# Patient Record
Sex: MALE | Race: BLACK OR AFRICAN AMERICAN | NOT HISPANIC OR LATINO | Employment: FULL TIME | URBAN - METROPOLITAN AREA
[De-identification: names, ages, dates, MRNs, and addresses within clinical notes are randomized per-mention and may not be internally consistent; named-entity substitution may affect disease eponyms.]

---

## 2018-08-02 ENCOUNTER — HOSPITAL ENCOUNTER (OUTPATIENT)
Dept: GENERAL RADIOLOGY | Facility: CLINIC | Age: 43
Discharge: HOME | End: 2018-08-02
Attending: INTERNAL MEDICINE | Admitting: INTERNAL MEDICINE

## 2018-08-02 DIAGNOSIS — R76.11 PPD POSITIVE: ICD-10-CM

## 2018-08-02 PROCEDURE — 40000293 XR CHEST 1 VIEW, EMPLOYEE HEALTH

## 2019-03-04 ENCOUNTER — OFFICE VISIT (OUTPATIENT)
Dept: FAMILY MEDICINE | Facility: CLINIC | Age: 44
End: 2019-03-04
Payer: COMMERCIAL

## 2019-03-04 VITALS
SYSTOLIC BLOOD PRESSURE: 142 MMHG | HEART RATE: 102 BPM | DIASTOLIC BLOOD PRESSURE: 96 MMHG | BODY MASS INDEX: 26.34 KG/M2 | RESPIRATION RATE: 18 BRPM | HEIGHT: 70 IN | TEMPERATURE: 98.1 F | WEIGHT: 184 LBS | OXYGEN SATURATION: 98 %

## 2019-03-04 DIAGNOSIS — R73.03 PRE-DIABETES: ICD-10-CM

## 2019-03-04 DIAGNOSIS — Z00.00 WELL ADULT EXAM: Primary | ICD-10-CM

## 2019-03-04 DIAGNOSIS — Z12.5 SCREENING FOR PROSTATE CANCER: ICD-10-CM

## 2019-03-04 DIAGNOSIS — R03.0 ELEVATED BLOOD PRESSURE READING WITHOUT DIAGNOSIS OF HYPERTENSION: ICD-10-CM

## 2019-03-04 LAB
CHOLEST SERPL-MCNC: 205 MG/DL
HBA1C MFR BLD: 6 % (ref 0–5.6)
HDLC SERPL-MCNC: 41 MG/DL
LDLC SERPL CALC-MCNC: 142 MG/DL
NONHDLC SERPL-MCNC: 164 MG/DL
PSA SERPL-ACNC: 0.85 UG/L (ref 0–4)
TRIGL SERPL-MCNC: 110 MG/DL

## 2019-03-04 PROCEDURE — 36415 COLL VENOUS BLD VENIPUNCTURE: CPT | Performed by: FAMILY MEDICINE

## 2019-03-04 PROCEDURE — 80061 LIPID PANEL: CPT | Performed by: FAMILY MEDICINE

## 2019-03-04 PROCEDURE — 83036 HEMOGLOBIN GLYCOSYLATED A1C: CPT | Performed by: FAMILY MEDICINE

## 2019-03-04 PROCEDURE — G0103 PSA SCREENING: HCPCS | Performed by: FAMILY MEDICINE

## 2019-03-04 PROCEDURE — 99386 PREV VISIT NEW AGE 40-64: CPT | Performed by: FAMILY MEDICINE

## 2019-03-04 ASSESSMENT — ENCOUNTER SYMPTOMS
COUGH: 0
ABDOMINAL PAIN: 0
PARESTHESIAS: 0
FEVER: 0
DIARRHEA: 0
PALPITATIONS: 0
HEMATURIA: 0
DYSURIA: 0
MYALGIAS: 0
SORE THROAT: 1
CHILLS: 0
HEADACHES: 0
HEARTBURN: 0
FREQUENCY: 0
HEMATOCHEZIA: 0
SHORTNESS OF BREATH: 0
NERVOUS/ANXIOUS: 0
WEAKNESS: 0
ARTHRALGIAS: 1
DIZZINESS: 0
NAUSEA: 0
EYE PAIN: 0
CONSTIPATION: 1
JOINT SWELLING: 0

## 2019-03-04 ASSESSMENT — MIFFLIN-ST. JEOR: SCORE: 1749.84

## 2019-03-04 NOTE — PROGRESS NOTES
SUBJECTIVE:   CC: Gosia Millan is an 43 year old male who presents for preventative health visit.     Physical   Annual:     Getting at least 3 servings of Calcium per day:  NO    Bi-annual eye exam:  Yes    Dental care twice a year:  NO    Sleep apnea or symptoms of sleep apnea:  None    Diet:  Regular (no restrictions)    Frequency of exercise:  None    Taking medications regularly:  Yes    Medication side effects:  None    Additional concerns today:  YES (cough, family history of diabetes and would like to get it tested)    PHQ-2 Total Score: 0    CV: no early CAD in the family, but many family members with diabetes including dad, brother, and many family members on mom's side (but not mom).  He does not exercise. He follows mostly a vegan diet.   Malignancy: states his father had colon cancer in his upper 60s-70, had chemo and surgery and is doing well.  No other known family members with colon cancer, no prostate cancer in the family.    Bone health: lack of exercise, calcium.   Immunizations: tdap done 2012.          Today's PHQ-2 Score:   PHQ-2 ( 1999 [employer]) 3/4/2019   Q1: Little interest or pleasure in doing things 0   Q2: Feeling down, depressed or hopeless 0   PHQ-2 Score 0   Q1: Little interest or pleasure in doing things Not at all   Q2: Feeling down, depressed or hopeless Not at all   PHQ-2 Score 0       Abuse: Current or Past(Physical, Sexual or Emotional)- No  Do you feel safe in your environment? Yes    Social History     Tobacco Use     Smoking status: Never Smoker     Smokeless tobacco: Never Used   Substance Use Topics     Alcohol use: Yes     Comment: once every month      Alcohol Use 3/4/2019   If you drink alcohol do you typically have greater than 3 drinks per day OR greater than 7 drinks per week? No   No flowsheet data found.    Last PSA: No results found for: PSA    Reviewed orders with patient. Reviewed health maintenance and updated orders accordingly - Yes  There is no problem list on  "file for this patient.    History reviewed. No pertinent surgical history.    Social History     Tobacco Use     Smoking status: Never Smoker     Smokeless tobacco: Never Used   Substance Use Topics     Alcohol use: Yes     Comment: once every month      History reviewed. No pertinent family history.      No current outpatient medications on file.     No Known Allergies    Reviewed and updated as needed this visit by clinical staff  Tobacco  Allergies  Meds  Med Hx  Surg Hx  Fam Hx  Soc Hx        Reviewed and updated as needed this visit by Provider            Review of Systems   Constitutional: Negative for chills and fever.   HENT: Positive for sore throat. Negative for congestion, ear pain and hearing loss.    Eyes: Negative for pain and visual disturbance.   Respiratory: Negative for cough and shortness of breath.    Cardiovascular: Negative for chest pain, palpitations and peripheral edema.   Gastrointestinal: Positive for constipation. Negative for abdominal pain, diarrhea, heartburn, hematochezia and nausea.   Genitourinary: Negative for discharge, dysuria, frequency, genital sores, hematuria, impotence and urgency.   Musculoskeletal: Positive for arthralgias. Negative for joint swelling and myalgias.   Skin: Negative for rash.   Neurological: Negative for dizziness, weakness, headaches and paresthesias.   Psychiatric/Behavioral: Negative for mood changes. The patient is not nervous/anxious.        OBJECTIVE:   BP (!) 150/94 (BP Location: Right arm, Patient Position: Sitting, Cuff Size: Adult Regular)   Pulse 102   Temp 98.1  F (36.7  C) (Oral)   Resp 18   Ht 1.784 m (5' 10.25\")   Wt 83.5 kg (184 lb)   SpO2 98%   BMI 26.21 kg/m      Physical Exam  GENERAL: healthy, alert and no distress  EYES: Eyes grossly normal to inspection, PERRL and conjunctivae and sclerae normal  HENT: ear canals and TM's normal, nose and mouth without ulcers or lesions  NECK: no adenopathy, no asymmetry, masses, or scars " and thyroid normal to palpation  RESP: lungs clear to auscultation - no rales, rhonchi or wheezes  CV: regular rate and rhythm, normal S1 S2, no S3 or S4, no murmur, click or rub, no peripheral edema and peripheral pulses strong  ABDOMEN: soft, nontender, no hepatosplenomegaly, no masses and bowel sounds normal  MS: no gross musculoskeletal defects noted, no edema  SKIN: no suspicious lesions or rashes  NEURO: Normal strength and tone, mentation intact and speech normal  PSYCH: mentation appears normal, affect normal/bright, appears anxious    Diagnostic Test Results:  Results for orders placed or performed in visit on 03/04/19 (from the past 24 hour(s))   Hemoglobin A1c   Result Value Ref Range    Hemoglobin A1C 6.0 (H) 0 - 5.6 %   Lipid Profile   Result Value Ref Range    Cholesterol 205 (H) <200 mg/dL    Triglycerides 110 <150 mg/dL    HDL Cholesterol 41 >39 mg/dL    LDL Cholesterol Calculated 142 (H) <100 mg/dL    Non HDL Cholesterol 164 (H) <130 mg/dL   PSA, screen   Result Value Ref Range    PSA 0.85 0 - 4 ug/L       ASSESSMENT/PLAN:   1. Well adult exam  CV: reviewed with the patient in clinic that his A1c is elevated in the pre-diabetes range.  Discussed increasing exercise and working on diet to limit carbs.  Also discussed that the BP is elevated today.  Recommended same as above, and less salt.  Recommended recheck within one month on the blood pressure.  Recommended recheck of A1c in 6-12 months.   Malignancy: PSA; colonoscopy at 52  Bone health: recommended regular exercise  Immunizations: up to date  - Hemoglobin A1c  - Lipid Profile  - PSA, screen    2. Screening for prostate cancer    - PSA, screen    3. Pre-diabetes  As above    4. Elevated blood pressure reading without diagnosis of hypertension  As above      COUNSELING:   Reviewed preventive health counseling, as reflected in patient instructions    BP Readings from Last 1 Encounters:   No data found for BP     There is no height or weight on  file to calculate BMI.           has no tobacco history on file.      Counseling Resources:  ATP IV Guidelines  Pooled Cohorts Equation Calculator  FRAX Risk Assessment  ICSI Preventive Guidelines  Dietary Guidelines for Americans, 2010  USDA's MyPlate  ASA Prophylaxis  Lung CA Screening    Sharee Hopkins MD  [facility]

## 2019-03-04 NOTE — LETTER
March 7, 2019      Gosia Millan  [address]  [place] [address]        Dear ,    We are writing to inform you of your test results.    As discussed at your visit, your A1c test for diabetes is elevated in the pre-diabetic range.  Your PSA screening test for prostate cancer is normal.  Your cholesterol is moderately elevated.  I recommend working on diet (fewer carbohydrates) and exercise to address the blood sugar and cholesterol.  We should recheck these labs in 6 to 12 months.     Resulted Orders   Hemoglobin A1c   Result Value Ref Range    Hemoglobin A1C 6.0 (H) 0 - 5.6 %      Comment:      Normal <5.7% Prediabetes 5.7-6.4%  Diabetes 6.5% or higher - adopted from ADA   consensus guidelines.     Lipid Profile   Result Value Ref Range    Cholesterol 205 (H) <200 mg/dL      Comment:      Desirable:       <200 mg/dl    Triglycerides 110 <150 mg/dL      Comment:      Fasting specimen    HDL Cholesterol 41 >39 mg/dL    LDL Cholesterol Calculated 142 (H) <100 mg/dL      Comment:      Above desirable:  100-129 mg/dl  Borderline High:  130-159 mg/dL  High:             160-189 mg/dL  Very high:       >189 mg/dl      Non HDL Cholesterol 164 (H) <130 mg/dL      Comment:      Above Desirable:  130-159 mg/dl  Borderline high:  160-189 mg/dl  High:             190-219 mg/dl  Very high:       >219 mg/dl     PSA, screen   Result Value Ref Range    PSA 0.85 0 - 4 ug/L      Comment:      Assay Method:  Chemiluminescence using Siemens Vista analyzer       If you have any questions or concerns, please call the clinic at the number listed above.       Sincerely,        Sharee Hopkins MD/nr

## 2019-05-08 ENCOUNTER — OFFICE VISIT (OUTPATIENT)
Dept: FAMILY MEDICINE | Facility: CLINIC | Age: 44
End: 2019-05-08
Payer: COMMERCIAL

## 2019-05-08 VITALS
TEMPERATURE: 98.1 F | DIASTOLIC BLOOD PRESSURE: 94 MMHG | RESPIRATION RATE: 16 BRPM | HEART RATE: 144 BPM | SYSTOLIC BLOOD PRESSURE: 143 MMHG | OXYGEN SATURATION: 100 % | BODY MASS INDEX: 25.77 KG/M2 | HEIGHT: 70 IN | WEIGHT: 180 LBS

## 2019-05-08 DIAGNOSIS — Z13.21 ENCOUNTER FOR VITAMIN DEFICIENCY SCREENING: ICD-10-CM

## 2019-05-08 DIAGNOSIS — R73.03 PRE-DIABETES: ICD-10-CM

## 2019-05-08 DIAGNOSIS — I10 HYPERTENSION GOAL BP (BLOOD PRESSURE) < 140/90: Primary | ICD-10-CM

## 2019-05-08 LAB — HBA1C MFR BLD: 5.9 % (ref 0–5.6)

## 2019-05-08 PROCEDURE — 36415 COLL VENOUS BLD VENIPUNCTURE: CPT | Performed by: NURSE PRACTITIONER

## 2019-05-08 PROCEDURE — 80053 COMPREHEN METABOLIC PANEL: CPT | Performed by: NURSE PRACTITIONER

## 2019-05-08 PROCEDURE — 99215 OFFICE O/P EST HI 40 MIN: CPT | Performed by: NURSE PRACTITIONER

## 2019-05-08 PROCEDURE — 82306 VITAMIN D 25 HYDROXY: CPT | Performed by: NURSE PRACTITIONER

## 2019-05-08 PROCEDURE — 84443 ASSAY THYROID STIM HORMONE: CPT | Performed by: NURSE PRACTITIONER

## 2019-05-08 PROCEDURE — 83036 HEMOGLOBIN GLYCOSYLATED A1C: CPT | Performed by: NURSE PRACTITIONER

## 2019-05-08 RX ORDER — IBUPROFEN 200 MG
200 TABLET ORAL EVERY 4 HOURS PRN
COMMUNITY
End: 2023-03-24

## 2019-05-08 RX ORDER — LISINOPRIL 10 MG/1
10 TABLET ORAL DAILY
Qty: 30 TABLET | Refills: 1 | Status: SHIPPED | OUTPATIENT
Start: 2019-05-08 | End: 2020-09-08

## 2019-05-08 ASSESSMENT — MIFFLIN-ST. JEOR: SCORE: 1731.69

## 2019-05-08 NOTE — PROGRESS NOTES
"  SUBJECTIVE:   Gosia Millan is a 43 year old male who presents to clinic today for the following   health issues:    Chief Complaint   Patient presents with     Dizziness     march 11,2019     Gosia reports that his dizziness started a week after his appointment in march.  His dizziness feels like \"I'm floating,\" \"there is no rotation or anything,\" \"the room is not spinning.\"  No chest pain, SOB.  He is eating and drinking okay.  \    His regular primary care provider is [facility].       at the [facility].  He has not missed any work.    With the labs that were done at his physical in March, he was told that he is prediabetic, too high of blood pressure, high cholesterol.  He has adjusted his lifestyle.  He has been eating less sugar, no sodium, less fat.  He has not been able to exercise because of his dizziness.      Reviewed  and updated as needed this visit by clinical staff  Tobacco  Allergies  Meds  Med Hx  Surg Hx  Fam Hx  Soc Hx        Reviewed and updated as needed this visit by Provider         Patient Active Problem List   Diagnosis     Pre-diabetes     History reviewed. No pertinent surgical history.    Social History     Tobacco Use     Smoking status: Never Smoker     Smokeless tobacco: Never Used   Substance Use Topics     Alcohol use: Yes     Comment: once every month      History reviewed. No pertinent family history.      Current Outpatient Medications   Medication Sig Dispense Refill     ibuprofen (ADVIL/MOTRIN) 200 MG tablet Take 200 mg by mouth every 4 hours as needed for mild pain       lisinopril (PRINIVIL/ZESTRIL) 10 MG tablet Take 1 tablet (10 mg) by mouth daily 30 tablet 1     vitamin D3 (CHOLECALCIFEROL) 04720 units capsule Take 1 capsule (50,000 Units) by mouth twice a week 16 capsule 0     No Known Allergies  Recent Labs   Lab Test 05/08/19  1642 03/04/19  1049   A1C 5.9* 6.0*   LDL  --  142*   HDL  --  41   TRIG  --  110   ALT 28  --    CR 0.70  --  " "  GFRESTIMATED >90  --    GFRESTBLACK >90  --    POTASSIUM 3.8  --    TSH 0.78  --       BP Readings from Last 3 Encounters:   05/08/19 (!) 143/94   03/04/19 (!) 142/96    Wt Readings from Last 3 Encounters:   05/08/19 81.6 kg (180 lb)   03/04/19 83.5 kg (184 lb)              Labs reviewed in EPIC    ROS:  Constitutional, HEENT, cardiovascular, pulmonary, GI, , musculoskeletal, neuro, skin, endocrine and psych systems are negative, except as otherwise noted.    OBJECTIVE:     BP (!) 143/94 (BP Location: Right arm, Patient Position: Sitting, Cuff Size: Adult Regular)   Pulse 144   Temp 98.1  F (36.7  C) (Oral)   Resp 16   Ht 1.784 m (5' 10.25\")   Wt 81.6 kg (180 lb)   SpO2 100%   BMI 25.64 kg/m    Body mass index is 25.64 kg/m .    GENERAL: healthy, alert and no distress  EYES: Eyes grossly normal to inspection, PERRL and conjunctivae and sclerae normal  HENT: ear canals and TM's normal, nose and mouth without ulcers or lesions  NECK: no adenopathy and thyroid normal to palpation  RESP: lungs clear to auscultation - no rales, rhonchi or wheezes  CV: regular rate and rhythm, normal S1 S2,no murmur, click or rub, no peripheral edema and peripheral pulses strong.  BPM during exam.   ABDOMEN: soft, nontender, no hepatosplenomegaly, no masses and bowel sounds normal. No rebound or guarding.   MS: no gross musculoskeletal defects noted, no edema. Ambulatory with a steady gait.   Neuro: CELESTINE. Reflexes equal throughout. Gait normal. Reflexes equal and normal throughout.  SKIN: warm and dry  NEURO: Normal strength and tone, mentation intact and speech normal  PSYCH: mentation appears normal, affect normal/bright    ASSESSMENT/PLAN:     (I10) Hypertension goal BP (blood pressure) < 140/90  (primary encounter diagnosis)  Comment: Uncontrolled  Plan: Comprehensive metabolic panel, Vitamin D         Deficiency, TSH with free T4 reflex, Hemoglobin        A1c, lisinopril (PRINIVIL/ZESTRIL) 10 MG tablet          I " had a discussion with the patient today about his history of prediabetes, his current blood pressure, and his symptom of feeling lightheaded or floating.  I do believe his symptoms are related to his blood pressure.  I did start him on lisinopril today, I talked about blood pressure management, I talked about the importance of his diet,  And I encouraged him to return to the clinic in 1 month for follow-up.  I also encouraged him to eat regular meals, drink plenty of water, get adequate sleep.  I told him that I think with blood pressure management, his feeling of floating will improve.  Also due to his dark skin, I did do a vitamin D screening.  I talked to him about vitamin D, energy level etc.  I do feel that have his vitamin D level is abnormal today, is likely also a portion of his symptoms.  I will let him know his vitamin D level and medication if needed.  I discussed red flag symptoms and reasons go the ER.  For now, anticipate his symptoms will gradually improve over the next 1 to 2 weeks as his blood pressure improves.  He is to return to clinic in 1 month, sooner if problems.      (R73.03) Pre-diabetes  Comment: History of  Plan: Comprehensive metabolic panel, Vitamin D         Deficiency, TSH with free T4 reflex, Hemoglobin        A1c        I encouraged him to continue his diabetes management.  I was unclear at the end of this appointment if he is going to switch to [place] as his primary care clinic or if he is can continue at [facility].  I will assume he will return here as we discussed about him returning here for his blood pressure follow-up.  Otherwise, I will expect that he is following up with his [facility] provider.      (Z13.21) Encounter for vitamin deficiency screening  Comment: Uncertain  Plan: Vitamin D Deficiency        Vitamin D level is pending at the time of his appointment.  I did discuss with him vitamin D levels, sun, supplements etc.  He will await the vitamin D level  report.      I spent a total of 30 4in with the patient, >50% time spent face to face counseling regarding the above issues, establishing a plan of care, and coordinating follow up care.     CAROLE Weber [facility]

## 2019-05-08 NOTE — PATIENT INSTRUCTIONS
Patient Education     High Blood Pressure, New, Begin Treatment  Your blood pressure was high enough today to start treatment with medicines. Often healthcare providers don t know what causes high blood pressure (hypertension). But it can be controlled with lifestyle changes and medicines. High blood pressure usually has no symptoms. But it can sometimes cause headache, dizziness, blurred vision, a rushing sound in your ears, chest pain, or shortness of breath. But even without symptoms, high blood pressure that s not treated raises your risk for heart attack, heart failure, and stroke. High blood pressure is a serious health risk and shouldn t be ignored.    Blood pressure measurements are given as 2 numbers. Systolic blood pressure is the upper number. This is the pressure when the heart contracts. Diastolic blood pressure is the lower number. This is the pressure when the heart relaxes between beats. You will see your blood pressure readings written together. For example, a person with a systolic pressure of 118 and a diastolic pressure of 78 will have 118/78 written in the medical record.   Blood pressure is categorized as normal, elevated, or stage 1 or stage 2 high blood pressure:    Normal blood pressure is systolic of less than 120 and diastolic of less than 80 (120/80)    Elevated blood pressure is systolic of 120 to 129 and diastolic less than 80    Stage 1 high blood pressure is systolic is 130 to 139 or diastolic between 80 to 89    Stage 2 high blood pressure is when systolic is 140 or higher or the diastolic is 90 or higher  Home care  If you have high blood pressure, you should do what is listed below to lower your blood pressure. If you are taking medicines for high blood pressure, these methods may reduce or end your need for medicines in the future.    Begin a weight-loss program if you are overweight.    Cut back on how much salt you get in your diet. Here s how to do this:  ? Don t eat foods  that have a lot of salt. These include olives, pickles, smoked meats, and salted potato chips.  ? Don t add salt to your food at the table.  ? Use only small amounts of salt when cooking.  ? Review food labels to track how much salt is in prepared foods.  ? When eating out, ask that no additional salt be added to your food order.    Begin an exercise program. Talk with your healthcare provider about the type of exercise program that would be best for you. It doesn't have to be hard. Even brisk walking for 20 minutes 3 times a week is a good form of exercise.    Don t take medicines that have heart stimulants. This includes many over-the-counter cold and sinus decongestant pills and sprays, as well as diet pills. Check the warnings about high blood pressure on the label. Before purchasing any over-the-counter medicines or supplements, always ask the pharmacist about the product's potential interaction with your high blood pressure and your high blood pressure medicines.    Stimulants such as amphetamine or cocaine could be lethal for someone with high blood pressure. Never take these.    Limit how much caffeine you get in your diet. Switch to caffeine-free products.    Stop smoking. If you are a long-time smoker, this can be hard. Enroll in a stop-smoking program to make it more likely that you will quit for good.    Learn how to handle stress. This is an important part of any program to lower blood pressure. Learn about relaxation methods like meditation, yoga, or biofeedback.    If your provider prescribed medicines, take them exactly as directed. Missing doses may cause your blood pressure get out of control.    If you miss a dose or doses, check with your healthcare provider or pharmacist about what to do.    Consider buying an automatic blood pressure machine. Your provider can make a recommendation. You can get one of these at most pharmacies.  The American Heart Association recommends the following guidelines  for home blood pressure monitoring:    Don't smoke or drink coffee or other caffeinated drinks for 30 minutes before taking your blood pressure.    Go to the bathroom before the test.    Relax for 5 minutes before taking the measurement.    Sit with your back supported (don't sit on a couch or soft chair); keep your feet on the floor uncrossed. Place your arm on a solid flat surface (like a table) with the upper part of the arm at heart level. Place the middle of the cuff directly above the bend of the elbow. Check the monitor's instruction manual for an illustration.    Take multiple readings. When you measure, take 2 to 3 readings one minute apart and record all of the results.    Take your blood pressure at the same time every day, or as your healthcare provider recommends.    Record the date, time, and blood pressure reading.    Take the record with you to your next medical appointment. If your blood pressure monitor has a built-in memory, simply take the monitor with you to your next appointment.    Call your provider if you have several high readings. Don't be frightened by a single high blood pressure reading, but if you get several high readings, check in with your healthcare provider.    Note: When blood pressure reaches a systolic (top number) of 180 or higher OR diastolic (bottom number) of 110 or higher, seek emergency medical treatment.  Follow-up care  Because a new blood pressure medicine was started today, it s important that you have your blood pressure rechecked. This is to make sure that the medicine is working and that you have no serious side effects. Keep all your follow up appointments. Write down medicine and blood pressure questions and bring them to your next appointment. If you have pressing concerns about your new medicine or your blood pressure, call your provider. Unless told otherwise, follow up with your healthcare provider or this facility within the next 3 days.  When to seek  medical advice  Call your healthcare provider right away if any of these occur:    Blood pressure reaches a systolic (top number) of 180 or higher, OR diastolic (bottom number) of 110 or higher, seek emergency medical treatment.    Chest pain or shortness of breath    Severe headache    Throbbing or rushing sound in the ears    Nosebleed    Sudden severe pain in your belly (abdomen)    Extreme drowsiness, confusion, or fainting    Dizziness or dizziness with a spinning sensation (vertigo)    Weakness of an arm or leg or one side of the face    You have problems speaking or seeing   Date Last Reviewed: 12/1/2016 2000-2018 [employer]. [address] [place] [id number]. All rights reserved. This information is not intended as a substitute for professional medical care. Always follow your healthcare professional's instructions.

## 2019-05-09 LAB
ALBUMIN SERPL-MCNC: 4.6 G/DL (ref 3.4–5)
ALP SERPL-CCNC: 100 U/L (ref 40–150)
ALT SERPL W P-5'-P-CCNC: 28 U/L (ref 0–70)
ANION GAP SERPL CALCULATED.3IONS-SCNC: 7 MMOL/L (ref 3–14)
AST SERPL W P-5'-P-CCNC: 19 U/L (ref 0–45)
BILIRUB SERPL-MCNC: 0.8 MG/DL (ref 0.2–1.3)
BUN SERPL-MCNC: 9 MG/DL (ref 7–30)
CALCIUM SERPL-MCNC: 9.4 MG/DL (ref 8.5–10.1)
CHLORIDE SERPL-SCNC: 106 MMOL/L (ref 94–109)
CO2 SERPL-SCNC: 24 MMOL/L (ref 20–32)
CREAT SERPL-MCNC: 0.7 MG/DL (ref 0.66–1.25)
GFR SERPL CREATININE-BSD FRML MDRD: >90 ML/MIN/{1.73_M2}
GLUCOSE SERPL-MCNC: 112 MG/DL (ref 70–99)
POTASSIUM SERPL-SCNC: 3.8 MMOL/L (ref 3.4–5.3)
PROT SERPL-MCNC: 8.2 G/DL (ref 6.8–8.8)
SODIUM SERPL-SCNC: 137 MMOL/L (ref 133–144)
TSH SERPL DL<=0.005 MIU/L-ACNC: 0.78 MU/L (ref 0.4–4)

## 2019-05-10 LAB — DEPRECATED CALCIDIOL+CALCIFEROL SERPL-MC: 5 UG/L (ref 20–75)

## 2019-09-13 ENCOUNTER — OFFICE VISIT (OUTPATIENT)
Dept: FAMILY MEDICINE | Facility: CLINIC | Age: 44
End: 2019-09-13
Payer: COMMERCIAL

## 2019-09-13 VITALS
TEMPERATURE: 98.1 F | SYSTOLIC BLOOD PRESSURE: 116 MMHG | BODY MASS INDEX: 26.34 KG/M2 | HEIGHT: 70 IN | WEIGHT: 184 LBS | DIASTOLIC BLOOD PRESSURE: 80 MMHG | RESPIRATION RATE: 16 BRPM | HEART RATE: 94 BPM | OXYGEN SATURATION: 98 %

## 2019-09-13 DIAGNOSIS — E55.9 VITAMIN D DEFICIENCY: Primary | ICD-10-CM

## 2019-09-13 DIAGNOSIS — I10 BENIGN ESSENTIAL HYPERTENSION: ICD-10-CM

## 2019-09-13 DIAGNOSIS — K59.00 CONSTIPATION, UNSPECIFIED CONSTIPATION TYPE: ICD-10-CM

## 2019-09-13 DIAGNOSIS — R73.03 PRE-DIABETES: ICD-10-CM

## 2019-09-13 PROCEDURE — 36415 COLL VENOUS BLD VENIPUNCTURE: CPT | Performed by: NURSE PRACTITIONER

## 2019-09-13 PROCEDURE — 99214 OFFICE O/P EST MOD 30 MIN: CPT | Performed by: NURSE PRACTITIONER

## 2019-09-13 PROCEDURE — 82306 VITAMIN D 25 HYDROXY: CPT | Performed by: NURSE PRACTITIONER

## 2019-09-13 RX ORDER — POLYETHYLENE GLYCOL 3350 17 G/17G
1 POWDER, FOR SOLUTION ORAL DAILY
Qty: 1 BOTTLE | Refills: 11 | Status: SHIPPED | OUTPATIENT
Start: 2019-09-13 | End: 2020-09-08

## 2019-09-13 ASSESSMENT — MIFFLIN-ST. JEOR: SCORE: 1744.84

## 2019-09-13 NOTE — PROGRESS NOTES
Subjective     Gosia Millan is a 44 year old male who presents to clinic today for the following health issues:  Chief Complaint   Patient presents with     Medication Question     would like to stop taking lisinopril        Hypertension:  Gosia was prescribed lisinopril in May for hypertension.  He took the lisinopril for one month and then he stopped.  At the time that his blood pressure was elevated, he did not have symptoms.  When he took the lisinopril, he did not have any side effects.    He stopped taking the medication, because he had a 30 tablets supply and he did not go get refills.  Since he stopped the medication, he has not noticed any changes in how he feels.  He is in the clinic today to check his blood pressure and to see if it would be okay if he  not go on lisinopril at this time.    Vitamin D:  He completed the high dose vitamin D supplement and he is now taking vitamin D 1000 units per day.  He is requesting his vitamin D level be checked today.    Diet: low salt, no sugars.  Exercise: gym, treadmill, weight lifting.     No chest pain, SOB.    Consipation:  Has been getting worse.  Last BM this am, constipated, and not much stool material.   Small amounts.  He is requesting medication to help with stools.   He tries to eat a high-fiber diet.  He reports that he drinks a lot of water in his day.    Prediabetes:  At a previous appointment, he was told that he has prediabetes.  This is confusing to him and he does not like the diagnosis.  He has been eating a low sugar diet.    He has been exercising more.      Patient Active Problem List   Diagnosis     Pre-diabetes     History reviewed. No pertinent surgical history.    Social History     Tobacco Use     Smoking status: Never Smoker     Smokeless tobacco: Never Used   Substance Use Topics     Alcohol use: Yes     Comment: once every month      History reviewed. No pertinent family history.      Current Outpatient Medications   Medication Sig  "Dispense Refill     ibuprofen (ADVIL/MOTRIN) 200 MG tablet Take 200 mg by mouth every 4 hours as needed for mild pain       vitamin D3 (CHOLECALCIFEROL) 38001 units capsule Take 1 capsule (50,000 Units) by mouth twice a week 16 capsule 0     lisinopril (PRINIVIL/ZESTRIL) 10 MG tablet Take 1 tablet (10 mg) by mouth daily (Patient not taking: Reported on 9/13/2019) 30 tablet 1     No Known Allergies  Recent Labs   Lab Test 05/08/19  1642 03/04/19  1049   A1C 5.9* 6.0*   LDL  --  142*   HDL  --  41   TRIG  --  110   ALT 28  --    CR 0.70  --    GFRESTIMATED >90  --    GFRESTBLACK >90  --    POTASSIUM 3.8  --    TSH 0.78  --       BP Readings from Last 3 Encounters:   09/13/19 116/80   05/08/19 (!) 143/94   03/04/19 (!) 142/96    Wt Readings from Last 3 Encounters:   09/13/19 83.5 kg (184 lb)   05/08/19 81.6 kg (180 lb)   03/04/19 83.5 kg (184 lb)               Reviewed and updated as needed this visit by Provider         Review of Systems   ROS COMP: Constitutional, HEENT, cardiovascular, pulmonary, GI, , musculoskeletal, neuro, skin, endocrine and psych systems are negative, except as otherwise noted.      Objective    /80 (BP Location: Left arm, Patient Position: Sitting, Cuff Size: Adult Large)   Pulse 94   Temp 98.1  F (36.7  C) (Oral)   Resp 16   Ht 1.784 m (5' 10.25\")   Wt 83.5 kg (184 lb)   SpO2 98%   BMI 26.21 kg/m    Body mass index is 26.21 kg/m .  Physical Exam   GENERAL: healthy, alert and no distress  EYES: Eyes grossly normal to inspection, PERRL and conjunctivae and sclerae normal  NECK: no adenopathy, no asymmetry, masses, or scars and thyroid normal to palpation  RESP: lungs clear to auscultation - no rales, rhonchi or wheezes  CV: regular rate and rhythm, normal S1 S2, no S3 or S4, no murmur, click or rub, no peripheral edema and peripheral pulses strong  ABDOMEN: soft, nontender, no hepatosplenomegaly, no masses and bowel sounds normal  MS: no gross musculoskeletal defects noted, no " "edema  SKIN: Warm and dry  NEURO: Normal strength and tone, mentation intact and speech normal  PSYCH: mentation appears normal, affect normal/bright    Diagnostic Test Results:  Labs reviewed in [facility]  Labs drawn, results pending        Assessment & Plan     (E55.9) Vitamin D deficiency  (primary encounter diagnosis)  Comment: History of  Plan: Vitamin D Deficiency        I did go ahead and recheck his vitamin D level today.  In the event that his vitamin D level remains low, I will either re-prescribe a high-dose vitamin D supplement, or may ask him to increase his daily dose of vitamin D.  He is aware of this plan.    (K59.00) Constipation, unspecified constipation type  Comment: Worse  Plan: polyethylene glycol (MIRALAX/GLYCOLAX) powder        For now, encouraged him to continue his fluid intake and to increase the fiber in his diet.  I did add MiraLAX for him for now.  I told him to take  1 capful per day for the next 3 to 5 days, and then take 1 capful daily as needed.  I discussed red flag symptoms, abdominal pain, and reasons to be evaluated.  I will plan to talk about this with him as his next appointment and he will come in sooner if any problems.    (I10) Benign essential hypertension  Comment: Now resolved  Plan: I encouraged him to continue a low-salt diet, check his blood pressure intermittently, and return to clinic in 6 months for recheck, sooner problems.    (R73.03) Pre-diabetes  Comment:   Plan: I encouraged him to continue a low sugar diet, less carbs, continue aerobic activity etc.  I will plan to recheck his hemoglobin A1c in 6 months, sooner if he has problems.     BMI:   Estimated body mass index is 26.21 kg/m  as calculated from the following:    Height as of this encounter: 1.784 m (5' 10.25\").    Weight as of this encounter: 83.5 kg (184 lb).   Weight management plan: Discussed healthy diet and exercise guidelines    See Patient Instructions    Return in about 6 months (around 3/13/2020) " for Physical Exam.    CAROLE Weber [facility]

## 2019-09-13 NOTE — PATIENT INSTRUCTIONS
Vitamin D:  [place], I recommend that you continue your vitamin D 1000 units per day.  I will let you know if you need to adjust your supplement or if we need to re-do the high dose prescription supplement.    Blood pressure:  Stop the lisinopril.  Take care of yourself.  Continue the low salt diet, exercise.  Return to the clinic in 6 months for your next physical, sooner if you have concerns.  Continue intermittent blood pressure monitoring and return any time if problems.    Blood sugar:  Continue low sugar diet and regular exercise.    Constipation:  Continue fiber in your diet, drinking water, etc.  Miralax: take a dose of miralax daily for the next 3-5 days, then use daily as needed.        Patient Education     Constipation (Adult)  Constipation means that you have bowel movements that are less frequent than usual. Stools often become very hard and difficult to pass.  Constipation is very common. At some point in life, it affects almost everyone. Since everyone's bowel habits are different, what is constipation to one person may not be to another. Your healthcare provider may do tests to diagnose constipation. It depends on what he or she finds when evaluating you.    Symptoms of constipation include:    Abdominal pain    Bloating    Vomiting    Painful bowel movements    Itching, swelling, bleeding, or pain around the anus  Causes  Constipation can have many causes. These include:    Diet low in fiber    Too much dairy    Not drinking enough liquids    Lack of exercise or physical activity (especially true for older adults)    Changes in lifestyle or daily routine, including pregnancy, aging, work, and travel    Frequent use or misuse of laxatives    Ignoring the urge to have a bowel movement or delaying it until later    Medicines, such as certain prescription pain medicines, iron supplements, antacids, certain antidepressants, and calcium supplements    Diseases like irritable bowel syndrome, bowel  obstructions, stroke, diabetes, thyroid disease, Parkinson disease, hemorrhoids, and colon cancer  Complications  Potential complications of constipation can include:    Hemorrhoids    Rectal bleeding from hemorrhoids or anal fissures (skin tears)    Hernias    Dependency on laxatives    Chronic constipation    Fecal impaction, a severe form of constipation in which a large amount of hard stool is in your rectum that you can't pass    Bowel obstruction or perforation  Home care  All treatment should be done after talking with your healthcare provider. This is especially true if you have another medical problems, are taking prescription medicines, or are an older adult. Treatment most often involves lifestyle changes. You may also need medicines. Your healthcare provider will tell you which will work best for you. Follow the advice below to help avoid this problem in the future.  Lifestyle changes  These lifestyle changes can help prevent constipation:    Diet. Eat a high-fiber diet, with fresh fruit and vegetables, and reduce dairy intake, meats, and processed foods    Fluids. It's important to get enough fluids each day. Drink plenty of water when you eat more fiber. If you are on diet that limits the amount of fluid you can have, talk about this with your healthcare provider.    Regular exercise. Check with your healthcare provider first.  Medicines  Take any medicines as directed. Some laxatives are safe to use only every now and then. Others can be taken on a regular basis. While laxatives don't cause bowel dependence, they are treating the symptoms. So your constipation may return if you don't make other changes. Talk with your healthcare provider or pharmacist if you have questions.  Prescription pain medicines can cause constipation. If you are taking this kind of medicine, ask your healthcare provider if you should also take a stool softener.  Medicines you may take to treat constipation include:    Fiber  supplements    Stool softeners    Laxatives    Enemas    Rectal suppositories  Follow-up care  Follow up with your healthcare provider if symptoms don't get better in the next few days. You may need to have more tests or see a specialist.  Call 911  Call 911 if any of these occur:    Trouble breathing    Stiff, rigid abdomen that is severely painful to touch    Confusion    Fainting or loss of consciousness    Rapid heart rate    Chest pain  When to seek medical advice  Call your healthcare provider right away if any of these occur:    Fever of 100.4 F (38 C) or higher, or as directed by your healthcare provider    Failure to resume normal bowel movements    Pain in your abdomen or back gets worse    Nausea or vomiting    Swelling in your abdomen    Blood in the stool    Black, tarry stool    Involuntary weight loss    Weakness  Date Last Reviewed: 6/1/2018 2000-2018 [employer]. [address] [place] [address], [place] [address]. All rights reserved. This information is not intended as a substitute for professional medical care. Always follow your healthcare professional's instructions.

## 2019-09-18 LAB — DEPRECATED CALCIDIOL+CALCIFEROL SERPL-MC: 35 UG/L (ref 20–75)

## 2019-09-18 NOTE — RESULT ENCOUNTER NOTE
Zoey Elise,    This note is to let you know that your vitamin D level came back normal.  I do recommend that you continue your over-the-counter vitamin D supplement of 1000 units a day to maintain your current vitamin D level.    Let me know if you have any questions.    Vonda LAM CNP

## 2020-03-11 ENCOUNTER — HEALTH MAINTENANCE LETTER (OUTPATIENT)
Age: 45
End: 2020-03-11

## 2020-09-08 ENCOUNTER — OFFICE VISIT (OUTPATIENT)
Dept: FAMILY MEDICINE | Facility: CLINIC | Age: 45
End: 2020-09-08
Payer: COMMERCIAL

## 2020-09-08 VITALS
BODY MASS INDEX: 26.34 KG/M2 | RESPIRATION RATE: 16 BRPM | HEART RATE: 123 BPM | DIASTOLIC BLOOD PRESSURE: 88 MMHG | WEIGHT: 184 LBS | TEMPERATURE: 98.2 F | SYSTOLIC BLOOD PRESSURE: 132 MMHG | OXYGEN SATURATION: 99 % | HEIGHT: 70 IN

## 2020-09-08 DIAGNOSIS — R42 DIZZINESS: ICD-10-CM

## 2020-09-08 DIAGNOSIS — D17.30 LIPOMA OF SKIN AND SUBCUTANEOUS TISSUE: ICD-10-CM

## 2020-09-08 DIAGNOSIS — I10 HYPERTENSION GOAL BP (BLOOD PRESSURE) < 140/90: ICD-10-CM

## 2020-09-08 DIAGNOSIS — Z83.3 FAMILY HISTORY OF DIABETES MELLITUS: ICD-10-CM

## 2020-09-08 DIAGNOSIS — M25.50 MULTIPLE JOINT PAIN: ICD-10-CM

## 2020-09-08 DIAGNOSIS — Z00.00 ROUTINE GENERAL MEDICAL EXAMINATION AT A HEALTH CARE FACILITY: Primary | ICD-10-CM

## 2020-09-08 DIAGNOSIS — R73.03 PRE-DIABETES: ICD-10-CM

## 2020-09-08 LAB
ALBUMIN SERPL-MCNC: 4.2 G/DL (ref 3.4–5)
ALP SERPL-CCNC: 95 U/L (ref 40–150)
ALT SERPL W P-5'-P-CCNC: 41 U/L (ref 0–70)
ANION GAP SERPL CALCULATED.3IONS-SCNC: 10 MMOL/L (ref 3–14)
AST SERPL W P-5'-P-CCNC: 25 U/L (ref 0–45)
BASOPHILS # BLD AUTO: 0 10E9/L (ref 0–0.2)
BASOPHILS NFR BLD AUTO: 0.2 %
BILIRUB SERPL-MCNC: 0.7 MG/DL (ref 0.2–1.3)
BUN SERPL-MCNC: 9 MG/DL (ref 7–30)
CALCIUM SERPL-MCNC: 9.6 MG/DL (ref 8.5–10.1)
CHLORIDE SERPL-SCNC: 107 MMOL/L (ref 94–109)
CHOLEST SERPL-MCNC: 194 MG/DL
CO2 SERPL-SCNC: 21 MMOL/L (ref 20–32)
CREAT SERPL-MCNC: 0.77 MG/DL (ref 0.66–1.25)
CREAT UR-MCNC: 280 MG/DL
DIFFERENTIAL METHOD BLD: NORMAL
EOSINOPHIL # BLD AUTO: 0.1 10E9/L (ref 0–0.7)
EOSINOPHIL NFR BLD AUTO: 1.1 %
ERYTHROCYTE [DISTWIDTH] IN BLOOD BY AUTOMATED COUNT: 12 % (ref 10–15)
ERYTHROCYTE [SEDIMENTATION RATE] IN BLOOD BY WESTERGREN METHOD: 6 MM/H (ref 0–15)
GFR SERPL CREATININE-BSD FRML MDRD: >90 ML/MIN/{1.73_M2}
GLUCOSE SERPL-MCNC: 132 MG/DL (ref 70–99)
HBA1C MFR BLD: 6 % (ref 0–5.6)
HCT VFR BLD AUTO: 48.6 % (ref 40–53)
HDLC SERPL-MCNC: 34 MG/DL
HGB BLD-MCNC: 16.6 G/DL (ref 13.3–17.7)
LDLC SERPL CALC-MCNC: 116 MG/DL
LYMPHOCYTES # BLD AUTO: 1.7 10E9/L (ref 0.8–5.3)
LYMPHOCYTES NFR BLD AUTO: 26.4 %
MCH RBC QN AUTO: 30.9 PG (ref 26.5–33)
MCHC RBC AUTO-ENTMCNC: 34.2 G/DL (ref 31.5–36.5)
MCV RBC AUTO: 90 FL (ref 78–100)
MICROALBUMIN UR-MCNC: 28 MG/L
MICROALBUMIN/CREAT UR: 10.11 MG/G CR (ref 0–17)
MONOCYTES # BLD AUTO: 0.4 10E9/L (ref 0–1.3)
MONOCYTES NFR BLD AUTO: 5.5 %
NEUTROPHILS # BLD AUTO: 4.4 10E9/L (ref 1.6–8.3)
NEUTROPHILS NFR BLD AUTO: 66.8 %
NONHDLC SERPL-MCNC: 160 MG/DL
PLATELET # BLD AUTO: 208 10E9/L (ref 150–450)
POTASSIUM SERPL-SCNC: 3.8 MMOL/L (ref 3.4–5.3)
PROT SERPL-MCNC: 8.3 G/DL (ref 6.8–8.8)
RBC # BLD AUTO: 5.38 10E12/L (ref 4.4–5.9)
SODIUM SERPL-SCNC: 138 MMOL/L (ref 133–144)
TRIGL SERPL-MCNC: 220 MG/DL
WBC # BLD AUTO: 6.6 10E9/L (ref 4–11)

## 2020-09-08 PROCEDURE — 36415 COLL VENOUS BLD VENIPUNCTURE: CPT | Performed by: NURSE PRACTITIONER

## 2020-09-08 PROCEDURE — 86431 RHEUMATOID FACTOR QUANT: CPT | Performed by: NURSE PRACTITIONER

## 2020-09-08 PROCEDURE — 99214 OFFICE O/P EST MOD 30 MIN: CPT | Mod: 25 | Performed by: NURSE PRACTITIONER

## 2020-09-08 PROCEDURE — 80061 LIPID PANEL: CPT | Performed by: NURSE PRACTITIONER

## 2020-09-08 PROCEDURE — 86038 ANTINUCLEAR ANTIBODIES: CPT | Performed by: NURSE PRACTITIONER

## 2020-09-08 PROCEDURE — 85652 RBC SED RATE AUTOMATED: CPT | Performed by: NURSE PRACTITIONER

## 2020-09-08 PROCEDURE — 82043 UR ALBUMIN QUANTITATIVE: CPT | Performed by: NURSE PRACTITIONER

## 2020-09-08 PROCEDURE — 83036 HEMOGLOBIN GLYCOSYLATED A1C: CPT | Performed by: NURSE PRACTITIONER

## 2020-09-08 PROCEDURE — 99396 PREV VISIT EST AGE 40-64: CPT | Performed by: NURSE PRACTITIONER

## 2020-09-08 PROCEDURE — 80053 COMPREHEN METABOLIC PANEL: CPT | Performed by: NURSE PRACTITIONER

## 2020-09-08 PROCEDURE — 85025 COMPLETE CBC W/AUTO DIFF WBC: CPT | Performed by: NURSE PRACTITIONER

## 2020-09-08 RX ORDER — LISINOPRIL 10 MG/1
10 TABLET ORAL DAILY
Qty: 90 TABLET | Refills: 3 | Status: CANCELLED | OUTPATIENT
Start: 2020-09-08

## 2020-09-08 ASSESSMENT — ENCOUNTER SYMPTOMS
ABDOMINAL PAIN: 1
DIZZINESS: 1

## 2020-09-08 ASSESSMENT — PATIENT HEALTH QUESTIONNAIRE - PHQ9
10. IF YOU CHECKED OFF ANY PROBLEMS, HOW DIFFICULT HAVE THESE PROBLEMS MADE IT FOR YOU TO DO YOUR WORK, TAKE CARE OF THINGS AT HOME, OR GET ALONG WITH OTHER PEOPLE: NOT DIFFICULT AT ALL
SUM OF ALL RESPONSES TO PHQ QUESTIONS 1-9: 3
SUM OF ALL RESPONSES TO PHQ QUESTIONS 1-9: 3

## 2020-09-08 ASSESSMENT — MIFFLIN-ST. JEOR: SCORE: 1739.84

## 2020-09-08 NOTE — PROGRESS NOTES
SUBJECTIVE:   CC: Gosia Millan is an 45 year old male who presents for preventative health visit.     Healthy Habits:     Getting at least 3 servings of Calcium per day:  Yes    Bi-annual eye exam:  Yes    Dental care twice a year:  NO    Sleep apnea or symptoms of sleep apnea:  None    Diet:  Regular (no restrictions)    Frequency of exercise:  1 day/week    Duration of exercise:  Less than 15 minutes    Taking medications regularly:  Yes    Medication side effects:  None    PHQ-2 Total Score: 3    Additional concerns today:  No    Today's PHQ-2 Score:   PHQ-2 ( 1999 [employer]) 9/8/2020   Q1: Little interest or pleasure in doing things 3   Q2: Feeling down, depressed or hopeless 0   PHQ-2 Score 3   Q1: Little interest or pleasure in doing things Nearly every day   Q2: Feeling down, depressed or hopeless Not at all   PHQ-2 Score 3       Abuse: Current or Past(Physical, Sexual or Emotional)- No  Do you feel safe in your environment? Yes      Social History     Tobacco Use     Smoking status: Never Smoker     Smokeless tobacco: Never Used   Substance Use Topics     Alcohol use: Yes     Comment: once every month      If you drink alcohol do you typically have >3 drinks per day or >7 drinks per week? No    Alcohol Use 9/8/2020   Prescreen: >3 drinks/day or >7 drinks/week? No   Prescreen: >3 drinks/day or >7 drinks/week? -   No flowsheet data found.    Last PSA:   PSA   Date Value Ref Range Status   03/04/2019 0.85 0 - 4 ug/L Final     Comment:     Assay Method:  Chemiluminescence using Siemens Vista analyzer       Reviewed orders with patient. Reviewed health maintenance and updated orders accordingly - Yes  Lab work is in process  Labs reviewed in EP[facility]  BP Readings from Last 3 Encounters:   09/08/20 132/88   09/13/19 116/80   05/08/19 (!) 143/94    Wt Readings from Last 3 Encounters:   09/08/20 83.5 kg (184 lb)   09/13/19 83.5 kg (184 lb)   05/08/19 81.6 kg (180 lb)                  Patient Active Problem List  "  Diagnosis     Pre-diabetes     Constipation, unspecified constipation type     History reviewed. No pertinent surgical history.    Social History     Tobacco Use     Smoking status: Never Smoker     Smokeless tobacco: Never Used   Substance Use Topics     Alcohol use: Yes     Comment: once every month      History reviewed. No pertinent family history.      Current Outpatient Medications   Medication Sig Dispense Refill     ibuprofen (ADVIL/MOTRIN) 200 MG tablet Take 200 mg by mouth every 4 hours as needed for mild pain       lisinopril (PRINIVIL/ZESTRIL) 10 MG tablet Take 1 tablet (10 mg) by mouth daily (Patient not taking: Reported on 9/13/2019) 30 tablet 1     polyethylene glycol (MIRALAX/GLYCOLAX) powder Take 17 g (1 capful) by mouth daily (Patient not taking: Reported on 9/8/2020) 1 Bottle 11     vitamin D3 (CHOLECALCIFEROL) 07619 units capsule Take 1 capsule (50,000 Units) by mouth twice a week (Patient not taking: Reported on 9/8/2020) 16 capsule 0     No Known Allergies  Recent Labs   Lab Test 05/08/19  1642 03/04/19  1049   A1C 5.9* 6.0*   LDL  --  142*   HDL  --  41   TRIG  --  110   ALT 28  --    CR 0.70  --    GFRESTIMATED >90  --    GFRESTBLACK >90  --    POTASSIUM 3.8  --    TSH 0.78  --         Reviewed and updated as needed this visit by clinical staff  Tobacco  Allergies  Meds  Med Hx  Surg Hx  Fam Hx  Soc Hx        Reviewed and updated as needed this visit by Provider          Hypertension:  He was prescribed lisinopril.  He stopped it 6 months ago.  BP has been good since.    \"whole body pain\"  Has been going on for 6-7 month.  \"the pain is everywhere.\"  Sometimes in ribs, legs, joints, back.  He will also get dizziness.  When he was a teenager he had a similar pain, but that did go away.  Tingling and burning in thighs, fingers, neck.  Family history of diabetes and he is worried about that.  He rommel frequency in urination, thirst, etc.  Fatigued. Difficulty exercising because of the " "fatigue.  He is trying to eat healthy and take care of himself.    Dizziness:  On the top of his head.  The dizziness \"stays there.\"  The intensity changes.  He has not missed work.  He does say that he had the worst headache of his life a few days ago.  That has resolved.  He is still worried about his dizziness.    Smal lump on right abd.  No pain.      Review of Systems   Gastrointestinal: Positive for abdominal pain.   Neurological: Positive for dizziness.     CONSTITUTIONAL: NEGATIVE for fever, chills, change in weight  INTEGUMENTARY/SKIN: NEGATIVE for worrisome rashes, moles or lesions. + for lump in right lower abd  EYES: NEGATIVE for vision changes or irritation  ENT: NEGATIVE for ear, mouth and throat problems  RESP: NEGATIVE for significant cough or SOB  CV: NEGATIVE for chest pain, palpitations or peripheral edema  GI: NEGATIVE for nausea, abdominal pain, heartburn, or change in bowel habits   male: negative for dysuria, hematuria, decreased urinary stream, erectile dysfunction, urethral discharge  MUSCULOSKELETAL: see HPI  NEURO: see HPI  PSYCHIATRIC: NEGATIVE for changes in mood or affect    OBJECTIVE:   /88 (BP Location: Right arm, Patient Position: Sitting, Cuff Size: Adult Regular)   Pulse 123   Temp 98.2  F (36.8  C) (Temporal)   Resp 16   Ht 1.784 m (5' 10.25\")   Wt 83.5 kg (184 lb)   SpO2 99%   BMI 26.21 kg/m      Physical Exam  GENERAL: healthy, alert and no distress  EYES: Eyes grossly normal to inspection, PERRL and conjunctivae and sclerae normal  HENT: ear canals and TM's normal, nose and mouth without ulcers or lesions  NECK: no adenopathy, no asymmetry, masses, or scars and thyroid normal to palpation  RESP: lungs clear to auscultation - no rales, rhonchi or wheezes  CV: regular rate and rhythm, normal S1 S2, no S3 or S4, no murmur, click or rub, no peripheral edema and peripheral pulses strong  ABDOMEN: soft, nontender, no hepatosplenomegaly, no masses and bowel sounds " normal.  No rebound or guarding.  There is a pea-sized, round rubbery superficial cystic mass felt in his right lower abdomen consistent with a lipoma.  MS: no gross musculoskeletal defects noted, no edema  SKIN: Warm and dry.  NEURO: Normal strength and tone, mentation intact and speech normal  PSYCH: mentation appears normal, affect normal/bright    Diagnostic Test Results:  Labs reviewed in [facility]  Labs drawn today, results pending.     ASSESSMENT/PLAN:     (Z00.00) Routine general medical examination at a health care facility  (primary encounter diagnosis)  Comment:   Plan: Albumin Random Urine Quantitative with Creat         Ratio, Comprehensive metabolic panel, Lipid         panel reflex to direct LDL Fasting, Hemoglobin         A1c, CBC with platelets and differential            (I10) Hypertension goal BP (blood pressure) < 140/90  Comment: improved  Plan: Albumin Random Urine Quantitative with Creat         Ratio, Comprehensive metabolic panel,         OFFICE/OUTPT VISIT,EST,LEVL IV       His blood pressure is controlled off of his lisinopril.  He will continue a healthy diet.  I did tell him that we will keep a good eye on his blood pressures and he will need treatment if his blood pressure is up again.  He agrees and understands.      (Z83.3) Family history of diabetes mellitus  Comment: Uncertain  Plan: Comprehensive metabolic panel, Hemoglobin A1c,         OFFICE/OUTPT VISIT,EST,LEVL IV        I did go ahead and do a hemoglobin A1c and blood sugar today due to his family history.    (R73.03) Pre-diabetes  Comment: History of/with no active symptoms  Plan: Comprehensive metabolic panel, Hemoglobin A1c,         OFFICE/OUTPT VISIT,EST,LEVL IV        I did talk with this patient about prediabetes, lower carb diet, regular aerobic activity etc.  Yearly clinic appointments for rechecks, sooner if worse.    (M25.50) Multiple joint pain  Comment: Uncertain  Plan: Rheumatoid factor, Anti Nuclear Sabra IgG by IFA       "   with Reflex, ESR: Erythrocyte sedimentation         rate, OFFICE/OUTPT VISIT,EST,LEVL IV        I had some difficulty fully understanding and grasping the patient's concerns today.  He talked at multiple joint pain, dizziness, headache, fatigue etc.  I did tell him that I need to rule out underlying metabolic issues including but not limited to type 2 diabetes, anemia, rheumatic joint condition etc.  For now, I encouraged him to eat a healthy diet, gentle stretching range of motion etc.  I will give him a referral to follow-up with a joint specialist with any abnormalities or if his joint pain lingers.  He is appreciative.    (R42) Dizziness  Comment: Etiology uncertain  Plan: NEUROLOGY ADULT REFERRAL, OFFICE/OUTPT         VISIT,EST,LEVL IV        As the patient talks more, he described the dizziness as well as a headache that he had within the last couple of days.  Because of his symptoms, I do want him to follow-up with a neurologist for consultation and treatment options.  The patient is very appreciative.  He thinks this is a great idea and he looks forward to that appointment with neurology.    (D17.30) Lipoma of skin and subcutaneous tissue of right lower abdomen  Comment:   Plan: For now, no treatment.  I did tell him to watch this site.  In the event that the cyst gets larger, I can either do an ultrasound to confirm lipoma or having be seen by a surgeon.  He agrees and understands.  He will let me know if he needs a referral to a specialist.      COUNSELING:   Reviewed preventive health counseling, as reflected in patient instructions    Estimated body mass index is 26.21 kg/m  as calculated from the following:    Height as of this encounter: 1.784 m (5' 10.25\").    Weight as of this encounter: 83.5 kg (184 lb).     Weight management plan: Discussed healthy diet and exercise guidelines    He reports that he has never smoked. He has never used smokeless tobacco.      Counseling Resources:  ATP IV " Guidelines  Pooled Cohorts Equation Calculator  FRAX Risk Assessment  ICSI Preventive Guidelines  Dietary Guidelines for Americans, 2010  USDA's MyPlate  ASA Prophylaxis  Lung CA Screening    CAROLE Weber [facility]  Answers for HPI/ROS submitted by the patient on 9/8/2020   Annual Exam:  If you checked off any problems, how difficult have these problems made it for you to do your work, take care of things at home, or get along with other people?: Not difficult at all  PHQ9 TOTAL SCORE: 3

## 2020-09-09 LAB
ANA SER QL IF: NEGATIVE
RHEUMATOID FACT SER NEPH-ACNC: 8 IU/ML (ref 0–20)

## 2020-09-09 ASSESSMENT — PATIENT HEALTH QUESTIONNAIRE - PHQ9: SUM OF ALL RESPONSES TO PHQ QUESTIONS 1-9: 3

## 2020-09-11 NOTE — RESULT ENCOUNTER NOTE
Zoey Elise,    This note is to let you know the results of your recent lab studies.    Your blood test for joint disorders, rheumatoid factor, esr, and CHARLIE, came back negative or normal.    Your hemoglobin A1c and blood sugar, do continue to show prediabetes.  I recommend that you eat a diet with fewer carbohydrates and sugars, get regular aerobic activity, and maintain a healthy weight.    Your cholesterol levels are mildly elevated. I recommend a diet low in fat and cholesterol as well as regular aerobic activity. I would like to recheck your cholesterol in one year.    I would like to see you in the clinic for a face-to-face appointment for follow-up in 6 months, sooner if you have any questions.    Vonda LAM CNP

## 2020-09-15 NOTE — PROGRESS NOTES
"INITIAL NEUROLOGY CONSULTATION    DATE OF VISIT: 9/16/2020  CLINIC LOCATION: [facility]  MRN: [id number]  PATIENT NAME: Gosia Millan  YOB: 1975    PRIMARY CARE PROVIDER: Physician No Ref-Primary     REASON FOR VISIT:   Chief Complaint   Patient presents with     Consult     Dizziness     HISTORY OF PRESENT ILLNESS:                                                    Mr. Gosia Millan is 45 year old right handed male patient with past medical history of prediabetes, who was seen in consultation today requested by Vonda Lopez NP,  for dizziness.    Per patient's report, approximately 12 months ago the patient developed intermittent dizziness and fatigue.  Over the last 6 months, symptoms became constant of variable intensity.  He describes his dizziness as \" sensation of floating, not lightheadedness or spinning/vertigo\".  Head turns might exacerbate the intensity of his dizziness, but he did not notice any changes with turns in bed or changes in body position.  Symptoms are worse when he is tired, and better when he is resting.    In addition, yesterday he also experienced sensation of heat and burning that affected his entire forehead and lasted for approximately 1 hour.  He reports bifrontal and top of the head intermittent pressure 5/10 headaches without associated symptoms that occur several times per week.  He also experiences whole body pain/fatigue that is most pronounced later in the day.  Shower and lying down makes them better.  No other aggravating/alleviating factors or additional focal neurological symptoms.  Denies any prior history of significant head injuries, seizures, or CNS infections.  No treatments tried.    Recent laboratory evaluation includes unremarkable CMP (except elevated glucose of 132), elevated hemoglobin A1C of 6.0, elevated LDL of 116, normal rheumatoid factor, CBC, and negative CHARLIE.    No prior brain imaging.    No additional useful " information is available in Care Everywhere, which was reviewed.    Review of Systems - the patient endorses fatigue, headaches, and lower extremity paresthesias.  All of them have been previously discussed with other medical providers. Otherwise, he denies any other complaints on 14-point comprehensive review of systems.  PAST MEDICAL/SURGICAL HISTORY:                                                    I personally reviewed patient's past medical and surgical history with the patient at today's visit.  Patient Active Problem List   Diagnosis     Pre-diabetes     Constipation, unspecified constipation type     MEDICATIONS:                                                    I personally reviewed patient's medications and allergies with the patient at today's visit.  ibuprofen (ADVIL/MOTRIN) 200 MG tablet, Take 200 mg by mouth every 4 hours as needed for mild pain    No current facility-administered medications on file prior to visit.     ALLERGIES:                                                    No Known Allergies  FAMILY/SOCIAL HISTORY:                                                    Family and social history was reviewed with the patient at today's visit.  No family history of neurological disorders.  Never smoker.  1 alcohol drink per month.  Denies recreational drug use.  , lives alone.  Works full-time as a medical lab scientist.  REVIEW OF SYSTEMS:                                                    Patient has completed a [facility] Patient Health History, including a 14-system review, which was personally reviewed, and pertinent positives are listed in HPI. He denies any additional problems on the further questioning.  EXAM:                                                    VITAL SIGNS:   BP (!) 136/95 (BP Location: Right arm, Patient Position: Standing, Cuff Size: Adult Regular)   Pulse 100   Temp 98.4  F (36.9  C) (Oral)   Resp 16   Wt 85.2 kg (187 lb 14.4 oz)   SpO2 99%   BMI  26.77 kg/m    Orthostatic vital signs:   Vitals:    09/16/20 1007 09/16/20 1008   BP: (!) 132/90 (!) 136/95   BP Location: Right arm Right arm   Patient Position: Supine Standing   Cuff Size: Adult Regular Adult Regular   Pulse: 94 100   Resp: 16    Temp: 98.4  F (36.9  C)    TempSrc: Oral    SpO2: 99%    Weight: 85.2 kg (187 lb 14.4 oz)      Mini-Cog Assessment:  Mini Cog Assessment  Clock Draw Score: 2 Normal  3 Item Recall: 3 objects recalled  Mini Cog Total Score: 5  Administered by: : Shalini Guzmán MA    General: pt is in NAD, cooperative.  Skin: normal turgor, moist mucous membranes, no lesions/rashes noticed.  HEENT: ATNC, EOMI, PERRL, white sclera, normal conjunctiva, b/l proptosis. No carotid bruits bilaterally.  Respiratory: lung sounds clear to auscultation bilaterally, no crackles, wheezes, rhonchi. Symmetric lung excursion, no accessory respiratory muscle use.  Cardiovascular: normal S1/S2, no murmurs/rubs/gallops.   Abdomen: Not distended.  : deferred.    Neurological:  Mental: alert, follows commands, Mini Cog Total Score: 5/5 with 3/3 on memory recall, no aphasia or dysarthria. Fund of knowledge is appropriate for age.  Cranial Nerves:  CN II: visual acuity - able to accurately count fingers with each eye. Visual fields intact, fundi: discs sharp, no papilledema and normal vessels bilaterally.  CN III, IV, VI: EOM intact, pupils equal and reactive, bilateral proptosis (chronic since birth).  No nystagmus.  No skew deviation.  CN V: facial sensation nl  CN VII: face symmetric, no facial droop  CN VIII: hearing normal.  Head impulse tests and [place]-Hallpike maneuvers are negative bilaterally.  CN IX: palate elevation symmetric, uvula at midline  CN XI SCM normal, shoulder shrug nl  CN XII: tongue midline  Motor: Strength: 5/5 in all major groups of all extremities. Normal tone. No abnormal movements. No pronator drift b/l.  Reflexes: Triceps, biceps, brachioradialis, patellar, and achilles reflexes normal  and symmetric. No clonus noted. Toes are down-going b/l.   Sensory: temperature, light touch, pinprick, and vibration intact. Romberg: negative.  Coordination: FNF and heel-shin tests intact b/l.   Gait:  Normal, able to tandem, toe, and heel walk.  DATA:   LABS/IMAGING/OTHER STUDIES: I reviewed pertinent medical records, including Care Everywhere, as detailed in the history of present illness.  ASSESSMENT AND PLAN:      ASSESSMENT: Gosia Millan is a 45 year old male patient with listed above past medical history, who presents with dizziness that started approximately 12 months ago intermittently and became constant over the last 6 months.    We had a detailed discussion with the patient regarding his presenting complaints.  Orthostatic testing today is negative.  The neurological exam today is non-focal, including vestibular testing.    I reviewed with the patient that the likelihood of neurological condition that would explain his symptoms is quite low, but not totally excluded.  We decided to proceed with brain MRI with and without contrast to evaluate for any brain lesions that would explain his sensation of dizziness/floating.  If negative, no additional neurological testing would be advised.    For his ongoing fatigue, I would like to check additional labs, including thiamine, B12, MMA, TSH, and vitamin D levels.    Gosia to follow up with Primary Care provider regarding elevated blood pressure.     DIAGNOSES:    ICD-10-CM    1. Dizziness  R42 MR Brain w/o & w Contrast   2. Fatigue, unspecified type  R53.83 Vitamin B12     TSH with free T4 reflex     Vitamin D Deficiency     Methylmalonic Acid     Vitamin B1 whole blood     PLAN: At today's visit we thoroughly discussed various diagnostic possibilities for patient's symptoms, necessary evaluation, and the plan, which includes:  Orders Placed This Encounter   Procedures     MR Brain w/o & w Contrast     Vitamin B12     TSH with free T4 reflex     Vitamin D  Deficiency     Methylmalonic Acid     Vitamin B1 whole blood     No new medications.    Additional recommendations after the work-up.    Next follow-up appointment is on as needed basis (based on results).    Total Time:  81 minutes with > 50% spent counseling the patient on stated above assessment and recommendations, including nature of the diagnosis, needed w/u, and proposed plan.  Additional time was used to answer questions regarding patient's symptoms, my recommendations, and the plan.    Dimitri Ward MD  [facility]  [place]  (Chart documentation was completed in part with Dragon voice-recognition software. Even though reviewed, some grammatical, spelling, and word errors may remain.)

## 2020-09-16 ENCOUNTER — OFFICE VISIT (OUTPATIENT)
Dept: NEUROLOGY | Facility: CLINIC | Age: 45
End: 2020-09-16
Payer: COMMERCIAL

## 2020-09-16 VITALS
HEART RATE: 100 BPM | BODY MASS INDEX: 26.77 KG/M2 | SYSTOLIC BLOOD PRESSURE: 136 MMHG | DIASTOLIC BLOOD PRESSURE: 95 MMHG | RESPIRATION RATE: 16 BRPM | OXYGEN SATURATION: 99 % | TEMPERATURE: 98.4 F | WEIGHT: 187.9 LBS

## 2020-09-16 DIAGNOSIS — R53.83 FATIGUE, UNSPECIFIED TYPE: ICD-10-CM

## 2020-09-16 DIAGNOSIS — R42 DIZZINESS: Primary | ICD-10-CM

## 2020-09-16 LAB
DEPRECATED CALCIDIOL+CALCIFEROL SERPL-MC: 19 UG/L (ref 20–75)
TSH SERPL DL<=0.005 MIU/L-ACNC: 0.94 MU/L (ref 0.4–4)
VIT B12 SERPL-MCNC: 215 PG/ML (ref 193–986)

## 2020-09-16 PROCEDURE — 83921 ORGANIC ACID SINGLE QUANT: CPT | Performed by: PSYCHIATRY & NEUROLOGY

## 2020-09-16 PROCEDURE — 99205 OFFICE O/P NEW HI 60 MIN: CPT | Performed by: PSYCHIATRY & NEUROLOGY

## 2020-09-16 PROCEDURE — 82306 VITAMIN D 25 HYDROXY: CPT | Performed by: PSYCHIATRY & NEUROLOGY

## 2020-09-16 PROCEDURE — 99000 SPECIMEN HANDLING OFFICE-LAB: CPT | Performed by: PSYCHIATRY & NEUROLOGY

## 2020-09-16 PROCEDURE — 84443 ASSAY THYROID STIM HORMONE: CPT | Performed by: PSYCHIATRY & NEUROLOGY

## 2020-09-16 PROCEDURE — 84425 ASSAY OF VITAMIN B-1: CPT | Mod: 90 | Performed by: PSYCHIATRY & NEUROLOGY

## 2020-09-16 PROCEDURE — 36415 COLL VENOUS BLD VENIPUNCTURE: CPT | Performed by: PSYCHIATRY & NEUROLOGY

## 2020-09-16 PROCEDURE — 82607 VITAMIN B-12: CPT | Performed by: PSYCHIATRY & NEUROLOGY

## 2020-09-16 NOTE — PATIENT INSTRUCTIONS
AFTER VISIT SUMMARY (AVS):    At today's visit we thoroughly discussed various diagnostic possibilities for your symptoms, necessary evaluation, and the plan, which includes:  Orders Placed This Encounter   Procedures     MR Brain w/o & w Contrast     Vitamin B12     TSH with free T4 reflex     Vitamin D Deficiency     Methylmalonic Acid     Vitamin B1 whole blood     No new medications.    Additional recommendations after the work-up.    Next follow-up appointment is on as needed basis (based on results).    Please do not hesitate to call me with any questions or concerns.    Thanks.

## 2020-09-16 NOTE — LETTER
"    9/16/2020         RE: Gosia Millan  [address]  [place] [address]        Dear Colleague,    Thank you for referring your patient, Gosia Millan, to the [facility]. Please see a copy of my visit note below.    INITIAL NEUROLOGY CONSULTATION    DATE OF VISIT: 9/16/2020  CLINIC LOCATION: [facility]  MRN: [id number]  PATIENT NAME: Gosia Millan  YOB: 1975    PRIMARY CARE PROVIDER: Physician No Ref-Primary     REASON FOR VISIT:   Chief Complaint   Patient presents with     Consult     Dizziness     HISTORY OF PRESENT ILLNESS:                                                    Mr. Gosia Millan is 45 year old right handed male patient with past medical history of prediabetes, who was seen in consultation today requested by Vonda Lopez NP,  for dizziness.    Per patient's report, approximately 12 months ago the patient developed intermittent dizziness and fatigue.  Over the last 6 months, symptoms became constant of variable intensity.  He describes his dizziness as \" sensation of floating, not lightheadedness or spinning/vertigo\".  Head turns might exacerbate the intensity of his dizziness, but he did not notice any changes with turns in bed or changes in body position.  Symptoms are worse when he is tired, and better when he is resting.    In addition, yesterday he also experienced sensation of heat and burning that affected his entire forehead and lasted for approximately 1 hour.  He reports bifrontal and top of the head intermittent pressure 5/10 headaches without associated symptoms that occur several times per week.  He also experiences whole body pain/fatigue that is most pronounced later in the day.  Shower and lying down makes them better.  No other aggravating/alleviating factors or additional focal neurological symptoms.  Denies any prior history of significant head injuries, seizures, or CNS infections.  No treatments " tried.    Recent laboratory evaluation includes unremarkable CMP (except elevated glucose of 132), elevated hemoglobin A1C of 6.0, elevated LDL of 116, normal rheumatoid factor, CBC, and negative CHARLIE.    No prior brain imaging.    No additional useful information is available in Care Everywhere, which was reviewed.    Review of Systems - the patient endorses fatigue, headaches, and lower extremity paresthesias.  All of them have been previously discussed with other medical providers. Otherwise, he denies any other complaints on 14-point comprehensive review of systems.  PAST MEDICAL/SURGICAL HISTORY:                                                    I personally reviewed patient's past medical and surgical history with the patient at today's visit.  Patient Active Problem List   Diagnosis     Pre-diabetes     Constipation, unspecified constipation type     MEDICATIONS:                                                    I personally reviewed patient's medications and allergies with the patient at today's visit.  ibuprofen (ADVIL/MOTRIN) 200 MG tablet, Take 200 mg by mouth every 4 hours as needed for mild pain    No current facility-administered medications on file prior to visit.     ALLERGIES:                                                    No Known Allergies  FAMILY/SOCIAL HISTORY:                                                    Family and social history was reviewed with the patient at today's visit.  No family history of neurological disorders.  Never smoker.  1 alcohol drink per month.  Denies recreational drug use.  , lives alone.  Works full-time as a medical lab scientist.  REVIEW OF SYSTEMS:                                                    Patient has completed a [facility] Patient Health History, including a 14-system review, which was personally reviewed, and pertinent positives are listed in HPI. He denies any additional problems on the further questioning.  EXAM:                                                     VITAL SIGNS:   BP (!) 136/95 (BP Location: Right arm, Patient Position: Standing, Cuff Size: Adult Regular)   Pulse 100   Temp 98.4  F (36.9  C) (Oral)   Resp 16   Wt 85.2 kg (187 lb 14.4 oz)   SpO2 99%   BMI 26.77 kg/m    Orthostatic vital signs:   Vitals:    09/16/20 1007 09/16/20 1008   BP: (!) 132/90 (!) 136/95   BP Location: Right arm Right arm   Patient Position: Supine Standing   Cuff Size: Adult Regular Adult Regular   Pulse: 94 100   Resp: 16    Temp: 98.4  F (36.9  C)    TempSrc: Oral    SpO2: 99%    Weight: 85.2 kg (187 lb 14.4 oz)      Mini-Cog Assessment:  Mini Cog Assessment  Clock Draw Score: 2 Normal  3 Item Recall: 3 objects recalled  Mini Cog Total Score: 5  Administered by: : Shalini Guzmán MA    General: pt is in NAD, cooperative.  Skin: normal turgor, moist mucous membranes, no lesions/rashes noticed.  HEENT: ATNC, EOMI, PERRL, white sclera, normal conjunctiva, b/l proptosis. No carotid bruits bilaterally.  Respiratory: lung sounds clear to auscultation bilaterally, no crackles, wheezes, rhonchi. Symmetric lung excursion, no accessory respiratory muscle use.  Cardiovascular: normal S1/S2, no murmurs/rubs/gallops.   Abdomen: Not distended.  : deferred.    Neurological:  Mental: alert, follows commands, Mini Cog Total Score: 5/5 with 3/3 on memory recall, no aphasia or dysarthria. Fund of knowledge is appropriate for age.  Cranial Nerves:  CN II: visual acuity - able to accurately count fingers with each eye. Visual fields intact, fundi: discs sharp, no papilledema and normal vessels bilaterally.  CN III, IV, VI: EOM intact, pupils equal and reactive, bilateral proptosis (chronic since birth).  No nystagmus.  No skew deviation.  CN V: facial sensation nl  CN VII: face symmetric, no facial droop  CN VIII: hearing normal.  Head impulse tests and [place]-Hallpike maneuvers are negative bilaterally.  CN IX: palate elevation symmetric, uvula at midline  CN XI SCM  normal, shoulder shrug nl  CN XII: tongue midline  Motor: Strength: 5/5 in all major groups of all extremities. Normal tone. No abnormal movements. No pronator drift b/l.  Reflexes: Triceps, biceps, brachioradialis, patellar, and achilles reflexes normal and symmetric. No clonus noted. Toes are down-going b/l.   Sensory: temperature, light touch, pinprick, and vibration intact. Romberg: negative.  Coordination: FNF and heel-shin tests intact b/l.   Gait:  Normal, able to tandem, toe, and heel walk.  DATA:   LABS/IMAGING/OTHER STUDIES: I reviewed pertinent medical records, including Care Everywhere, as detailed in the history of present illness.  ASSESSMENT AND PLAN:      ASSESSMENT: Gosia Millan is a 45 year old male patient with listed above past medical history, who presents with dizziness that started approximately 12 months ago intermittently and became constant over the last 6 months.    We had a detailed discussion with the patient regarding his presenting complaints.  Orthostatic testing today is negative.  The neurological exam today is non-focal, including vestibular testing.    I reviewed with the patient that the likelihood of neurological condition that would explain his symptoms is quite low, but not totally excluded.  We decided to proceed with brain MRI with and without contrast to evaluate for any brain lesions that would explain his sensation of dizziness/floating.  If negative, no additional neurological testing would be advised.    For his ongoing fatigue, I would like to check additional labs, including thiamine, B12, MMA, TSH, and vitamin D levels.    Goisa to follow up with Primary Care provider regarding elevated blood pressure.     DIAGNOSES:    ICD-10-CM    1. Dizziness  R42 MR Brain w/o & w Contrast   2. Fatigue, unspecified type  R53.83 Vitamin B12     TSH with free T4 reflex     Vitamin D Deficiency     Methylmalonic Acid     Vitamin B1 whole blood     PLAN: At today's visit we  thoroughly discussed various diagnostic possibilities for patient's symptoms, necessary evaluation, and the plan, which includes:  Orders Placed This Encounter   Procedures     MR Brain w/o & w Contrast     Vitamin B12     TSH with free T4 reflex     Vitamin D Deficiency     Methylmalonic Acid     Vitamin B1 whole blood     No new medications.    Additional recommendations after the work-up.    Next follow-up appointment is on as needed basis (based on results).    Total Time:  81 minutes with > 50% spent counseling the patient on stated above assessment and recommendations, including nature of the diagnosis, needed w/u, and proposed plan.  Additional time was used to answer questions regarding patient's symptoms, my recommendations, and the plan.    Dimitri Ward MD  [facility]  [place]  (Chart documentation was completed in part with Dragon voice-recognition software. Even though reviewed, some grammatical, spelling, and word errors may remain.)            Again, thank you for allowing me to participate in the care of your patient.        Sincerely,        Dimitri Ward MD

## 2020-09-19 LAB — VIT B1 BLD-MCNC: 162 NMOL/L (ref 70–180)

## 2020-09-24 LAB — METHYLMALONATE SERPL-SCNC: 0.19 UMOL/L (ref 0–0.4)

## 2020-10-02 ENCOUNTER — HOSPITAL ENCOUNTER (OUTPATIENT)
Dept: MRI IMAGING | Facility: CLINIC | Age: 45
Discharge: HOME | End: 2020-10-02
Attending: PSYCHIATRY & NEUROLOGY | Admitting: PSYCHIATRY & NEUROLOGY
Payer: COMMERCIAL

## 2020-10-02 DIAGNOSIS — R42 DIZZINESS: ICD-10-CM

## 2020-10-02 PROCEDURE — 70553 MRI BRAIN STEM W/O & W/DYE: CPT | Mod: 26 | Performed by: STUDENT IN AN ORGANIZED HEALTH CARE EDUCATION/TRAINING PROGRAM

## 2020-10-02 PROCEDURE — 255N000002 HC RX 255 OP 636: Performed by: PSYCHIATRY & NEUROLOGY

## 2020-10-02 PROCEDURE — 70553 MRI BRAIN STEM W/O & W/DYE: CPT

## 2020-10-02 PROCEDURE — A9585 GADOBUTROL INJECTION: HCPCS | Performed by: PSYCHIATRY & NEUROLOGY

## 2020-10-02 RX ORDER — GADOBUTROL 604.72 MG/ML
10 INJECTION INTRAVENOUS ONCE
Status: COMPLETED | OUTPATIENT
Start: 2020-10-02 | End: 2020-10-02

## 2020-10-02 RX ADMIN — GADOBUTROL 8.5 ML: 604.72 INJECTION INTRAVENOUS at 14:52

## 2020-12-15 ENCOUNTER — NURSE TRIAGE (OUTPATIENT)
Dept: NURSING | Facility: CLINIC | Age: 45
End: 2020-12-15

## 2020-12-15 ENCOUNTER — E-VISIT (OUTPATIENT)
Dept: URGENT CARE | Facility: CLINIC | Age: 45
End: 2020-12-15
Payer: COMMERCIAL

## 2020-12-15 DIAGNOSIS — Z20.822 CLOSE EXPOSURE TO 2019 NOVEL CORONAVIRUS: Primary | ICD-10-CM

## 2020-12-15 PROCEDURE — 99421 OL DIG E/M SVC 5-10 MIN: CPT | Performed by: EMERGENCY MEDICINE

## 2020-12-15 NOTE — PATIENT INSTRUCTIONS
Dear Gosia Millan,    Based on your exposure to COVID-19 (coronavirus), we would like to test you for this virus. I have placed an order for this test.    For all employees or close contacts (except [place] and [employer] - see below), go to your [employer] home page and scroll down to the section that says  You have an appointment that needs to be scheduled  and click the large green button that says  Schedule Now  and follow the steps to find the next available opening.     If you are unable to complete these steps or if you cannot find any available times, please call [phone] to schedule employee testing.       [place] employees or close contacts, please call [phone].   [place] ([employer]) employees or close contacts call [phone].      If you know you have had close contact with someone who tested positive, you should be quarantined for 14 days after this exposure. You should stay in quarantine for the14 days even if the covid test is negative.     Quarantine means:  Stay home and away from others. Don't go to school or anywhere else. Generally quarantine means staying home from work but there are some exceptions to this. Please contact your workplace.  No hugging, kissing or shaking hands.  Don't let anyone visit.  Cover your mouth and nose with a mask, tissue or washcloth to avoid spreading germs.  Wash your hands and face often. Use soap and water.    What are the symptoms of COVID-19?  The most common symptoms are cough, fever and trouble breathing. Less common symptoms include headache, body aches, fatigue (feeling very tired), chills, sore throat, stuffy or runny nose, diarrhea (loose poop), loss of taste or smell, belly pain, and nausea or vomiting (feeling sick to your stomach or throwing up).  After 14 days, if you have still don't have symptoms, you likely don't have this virus.  If you develop symptoms, follow these guidelines.  If you're normally healthy: Please start another  eVisit.  If you have a serious health problem (like cancer, heart failure, an organ transplant or kidney disease): Call your specialty clinic. Let them know that you might have COVID-19.    When it's time for your COVID test:  Stay at least 6 feet away from others. (If someone will drive you to your test, stay in the backseat, as far away from the  as you can.)  Cover your mouth and nose with a mask, tissue or washcloth.  Go straight to the testing site. Don't make any stops on the way there or back.    Please note  Patients in these groups are at risk for severe illness due to COVID-19:    People 65 years and older    People who live in a nursing home or long-term care facility    People with chronic disease (lung, heart, cancer, diabetes, kidney, liver, immunologic)    People who have a weakened immune system, including those who:  o Are in cancer treatment  o Take medicine that weakens the immune system, such as corticosteroids  o Had a bone marrow or organ transplant  o Have an immune deficiency  o Have poorly controlled HIV or AIDS  o Are obese (body mass index of 40 or higher)  o Smoke regularly    Where can I get more information?  [facility] - About COVID-19: [email or web address]  CDC - What to Do If You're Sick: www.cdc.gov/coronavirus/2019-ncov/about/steps-when-sick.html  CDC - Ending Home Isolation: www.cdc.gov/coronavirus/2019-ncov/hcp/disposition-in-home-patients.html  CDC - Caring for Someone: www.cdc.gov/coronavirus/2019-ncov/if-you-are-sick/care-for-someone.html  [facility] - Interim Guidance for Hospital Discharge to Home: www.health.[facility].mn.us/diseases/coronavirus/hcp/hospdischarge.pdf  [facility] clinical trials (COVID-19 research studies): clinicalaffairs.[facility]/n-clinical-trials  Below are the COVID-19 hotlines at the [place] Department of Health ([facility]). Interpreters are available.  For health questions: Call 952-558-5332 or 1-781.340.4644 (7 a.m. to 7  p.m.)  For questions about schools and childcare: Call 043-033-4310 or 1-246.774.2979 (7 a.m. to 7 p.m.)    December 15, 2020    RE:  Gosia Millan                                                                                                                                                       [address]  [place] [address]        To whom it may concern:    I evaluated Gosia Millan on December 15, 2020. Hiruy Monty should be excused from work/school.    If you were exposed to someone who has tested positive for COVID-19, you can return to work 14 days after your last contact with the positive individual, provided you do not have symptoms at all during that time. In some cases, your manager may ask you to come back sooner than 14 days.     Note: If you have ongoing exposure to the covid positive person, this quarantine period may be more than 14 days. (For example, if you are still being exposed to your child and cannot isolate from them, then the quarantine of 14 days can't start until your child is no longer contagious. This is typically 10 day from onset of the child's symptoms. So the total duration is 24 days in this case.)       Sincerely,  Chalino Guevara MD  December 15, 2020    RE:  Gosia Millan                                                                                                                                                       [address]  [place] [address]        To whom it may concern:    I evaluated Gosia Millan on December 15, 2020. Hiruy Monty should be excused from work/school.    If you were exposed to someone who has tested positive for COVID-19, you can return to work 14 days after your last contact with the positive individual, provided you do not have symptoms at all during that time.    Note: If you have ongoing exposure to the covid positive person, this quarantine period may be more than 14 days. (For example, if you are still  being exposed to your child and cannot isolate from them, then the quarantine of 14 days can't start until your child is no longer contagious. This is typically 10 day from onset of the child's symptoms. So the total duration is 24 days in this case.)       Sincerely,  Chalino Guevara MD

## 2020-12-15 NOTE — TELEPHONE ENCOUNTER
Patient calling - says his mom had COVID19 symptoms yesterday so he took her to get tested yesterday.  He decided to get tested today because he is a [employer] employee.  He went to an outside testing location and was told he would get results in 3-5 days.    Today he found out his mother is positive for COVID19.  He is asking if he should get another test.  Advised patient per protocol - best time to get tested is 6 to 8 days after exposure.  Advised he could wait for results from test he had today.  If those are negative he may want to get another at the 6 to 8 day rafael in case he tested too soon the first time.  Care advice and isolation recommendations given per protocol.  Advised to call back if he develops symptoms.    Anni Ochoa RN  Triage Nurse Advisor    COVID 19 Nurse Triage Plan/Patient Instructions    Please be aware that novel coronavirus (COVID-19) may be circulating in the community. If you develop symptoms such as fever, cough, or SOB or if you have concerns about the presence of another infection including coronavirus (COVID-19), please contact your health care provider or visit [email or web address].     Disposition/Instructions    Virtual Visit with provider recommended. Reference Visit Selection Guide.    Thank you for taking steps to prevent the spread of this virus.  o Limit your contact with others.  o Wear a simple mask to cover your cough.  o Wash your hands well and often.    Resources    [facility]: About COVID-19: [email or web address]    CDC: What to Do If You're Sick: www.cdc.gov/coronavirus/2019-ncov/about/steps-when-sick.html    CDC: Ending Home Isolation: www.cdc.gov/coronavirus/2019-ncov/hcp/disposition-in-home-patients.html     CDC: Caring for Someone: www.cdc.gov/coronavirus/2019-ncov/if-you-are-sick/care-for-someone.html     [facility]: Interim Guidance for Hospital Discharge to Home: www.health.[facility].mn.us/diseases/coronavirus/hcp/hospdischarge.pdf    [facility]  clinical trials (COVID-19 research studies): clinicalaffairs.[facility]-clinical-trials     Below are the COVID-19 hotlines at the [place] Department of Health ([facility]). Interpreters are available.   o For health questions: Call 094-981-9052 or 1-713.661.4222 (7 a.m. to 7 p.m.)  o For questions about schools and childcare: Call 572-269-3942 or 1-943.688.9011 (7 a.m. to 7 p.m.)       Reason for Disposition    [1] CLOSE CONTACT COVID-19 EXPOSURE within last 14 days AND [2] NO symptoms    Protocols used: CORONAVIRUS (COVID-19) EXPOSURE-A- 12.1

## 2021-01-03 ENCOUNTER — HEALTH MAINTENANCE LETTER (OUTPATIENT)
Age: 46
End: 2021-01-03

## 2021-05-14 ENCOUNTER — OFFICE VISIT (OUTPATIENT)
Dept: FAMILY MEDICINE | Facility: CLINIC | Age: 46
End: 2021-05-14
Payer: COMMERCIAL

## 2021-05-14 ENCOUNTER — TELEPHONE (OUTPATIENT)
Dept: PHYSICAL MEDICINE AND REHAB | Facility: CLINIC | Age: 46
End: 2021-05-14

## 2021-05-14 VITALS
HEIGHT: 72 IN | DIASTOLIC BLOOD PRESSURE: 90 MMHG | BODY MASS INDEX: 25.06 KG/M2 | OXYGEN SATURATION: 98 % | WEIGHT: 185 LBS | TEMPERATURE: 97.8 F | HEART RATE: 111 BPM | SYSTOLIC BLOOD PRESSURE: 137 MMHG

## 2021-05-14 DIAGNOSIS — M25.50 MULTIPLE JOINT PAIN: ICD-10-CM

## 2021-05-14 DIAGNOSIS — R07.89 CHEST DISCOMFORT: ICD-10-CM

## 2021-05-14 DIAGNOSIS — E55.9 VITAMIN D DEFICIENCY: ICD-10-CM

## 2021-05-14 DIAGNOSIS — R73.03 PRE-DIABETES: Primary | ICD-10-CM

## 2021-05-14 DIAGNOSIS — R00.0 SINUS TACHYCARDIA: ICD-10-CM

## 2021-05-14 LAB
ANION GAP SERPL CALCULATED.3IONS-SCNC: 9 MMOL/L (ref 3–14)
BUN SERPL-MCNC: 8 MG/DL (ref 7–30)
CALCIUM SERPL-MCNC: 9.1 MG/DL (ref 8.5–10.1)
CHLORIDE SERPL-SCNC: 105 MMOL/L (ref 94–109)
CO2 SERPL-SCNC: 24 MMOL/L (ref 20–32)
CREAT SERPL-MCNC: 0.77 MG/DL (ref 0.66–1.25)
DEPRECATED CALCIDIOL+CALCIFEROL SERPL-MC: 15 UG/L (ref 20–75)
GFR SERPL CREATININE-BSD FRML MDRD: >90 ML/MIN/{1.73_M2}
GLUCOSE SERPL-MCNC: 128 MG/DL (ref 70–99)
HBA1C MFR BLD: 5.8 % (ref 0–5.6)
POTASSIUM SERPL-SCNC: 4.1 MMOL/L (ref 3.4–5.3)
SODIUM SERPL-SCNC: 138 MMOL/L (ref 133–144)
TSH SERPL DL<=0.005 MIU/L-ACNC: 1.07 MU/L (ref 0.4–4)

## 2021-05-14 PROCEDURE — 84443 ASSAY THYROID STIM HORMONE: CPT | Performed by: STUDENT IN AN ORGANIZED HEALTH CARE EDUCATION/TRAINING PROGRAM

## 2021-05-14 PROCEDURE — 93000 ELECTROCARDIOGRAM COMPLETE: CPT | Performed by: STUDENT IN AN ORGANIZED HEALTH CARE EDUCATION/TRAINING PROGRAM

## 2021-05-14 PROCEDURE — 80048 BASIC METABOLIC PNL TOTAL CA: CPT | Performed by: STUDENT IN AN ORGANIZED HEALTH CARE EDUCATION/TRAINING PROGRAM

## 2021-05-14 PROCEDURE — 36415 COLL VENOUS BLD VENIPUNCTURE: CPT | Performed by: STUDENT IN AN ORGANIZED HEALTH CARE EDUCATION/TRAINING PROGRAM

## 2021-05-14 PROCEDURE — 99214 OFFICE O/P EST MOD 30 MIN: CPT | Performed by: STUDENT IN AN ORGANIZED HEALTH CARE EDUCATION/TRAINING PROGRAM

## 2021-05-14 PROCEDURE — 83036 HEMOGLOBIN GLYCOSYLATED A1C: CPT | Performed by: STUDENT IN AN ORGANIZED HEALTH CARE EDUCATION/TRAINING PROGRAM

## 2021-05-14 PROCEDURE — 82306 VITAMIN D 25 HYDROXY: CPT | Performed by: STUDENT IN AN ORGANIZED HEALTH CARE EDUCATION/TRAINING PROGRAM

## 2021-05-14 ASSESSMENT — MIFFLIN-ST. JEOR: SCORE: 1772.15

## 2021-05-14 NOTE — TELEPHONE ENCOUNTER
[facility]    Phone Message    May a detailed message be left on voicemail: yes     Reason for Call: Appointment Intake    Referring Provider Name: Jia Shaikh MD   Diagnosis and/or Symptoms: Multiple joint pain     Please review and contact the patient to schedule. Writer does not see this diagnosis in the protocols.     Action Taken: Other: PMR    Travel Screening: Not Applicable

## 2021-05-14 NOTE — PATIENT INSTRUCTIONS
I do not think there is a worrisome cause for your joint pains.     I would recommend starting vitamin D3 2000 IUs daily as well as curcumin 1000 mg twice daily taken with meals.    I am referring you to physiatry. You should consider trying acupuncture. Resources below.    Follow up in 3 months for diabetes check and blood pressure or sooner if needed.       INTEGRATIVE HEALTH RESOURCES    [employer] [facility] (acupuncture)  [address] [place] [address]  [phone]. Dial 1.         Student therapists: $36/session,  therapists: $50/session, PhD therapists: $68/session. Also accept Blue Cross, [employer], and [employer] insurance    [facility] (mind-body therapies, chiropractic, massage, nutrition)  [address] [place] [address]  [phone]  [email or web address]   Provides holistic mental health services, neurofeedback, medical hypnosis, energy work, reiki, acupuncture, chiropractic care, qigong and others.  Price varies by service. Some discount pricing is available.     [facility] (massage)  [address] [place] [address]    [phone]  [email or web address]   Student therapists: $45/hour    [facility] (acupuncture)  [address] [place] [address]  [phone]  www.[email or web address]  Sliding scale: $20-$45/session plus $10 fee for the first visit    [facility] (massage, acupuncture)  [address] [place] [address]  [phone]  [email or web address]://www.[facility]/health-clinic-[place]/clinical-services/  Student acupuncture: $40/session  Student massage: $40/ 60 min., $60/90 min; alumni massage: $50/60 min.    [facility] (acupuncture, chiropractic, massage, medical, mental health)  [address] [place], [place] [address] [phone]    [email or web address]://www.[facility]/[place]/  A student-run clinic that provides chiropractic care, acupuncture, massage therapy, medical services, counseling and health coaching. Services are free and open to the public.  Walk in during clinic hours to schedule an intake visit: Wednesday 5:30 pm-9:30 pm, Saturday 8:30 am-12:30 pm.    [facility] (acupuncture)  [address] [place] [address]  [phone]  www.[facility] SystemImbera ElectronicsUniversity Hospitals Geneva Medical Centeracupuncture.lifeaction games  Sliding scale: $15-$35/session    [facility] (massage)  [address] [place] [address]  [phone]  [email or web address]://[email or web address]/studentservices/Documents/[facility].pdf  Student massage $25/60 min., $35/90 min.  Only open during spring and summer, Monday and Thursday afternoons.    [facility] (acupuncture)  [address] [place] [address]  [phone]  www.[email or web address]  Sliding scale: $15-40/session plus $10 for first visit    [facility] (nutrition, exercise, mind-body therapies)  [address] [place] [address]  [phone]  [email or web address]://[email or web address]/services/ways-to-wellness   Class series and individual coaching on cooking, nutrition, fitness, lifestyle, chronic disease management, mental health. Staff includes a , dieticians, psychologist, personal trainers, and life coaches. Open to everyone. Also offers classes and coaching at the [facility] and [facility] in [place].  Pricing varies, but most are 8 sessions for $80. Drop-in fee is $15. Personalized offerings cost more.    A note about chiropractic services: Several chiropractic clinics accept insurance, including medical assistance and Medicare or offer flexible payment packages and reduced fees. Call your local chiropractor to ask about services or visit [email or web address]?[email or web address] for a list of clinics.

## 2021-05-14 NOTE — PROGRESS NOTES
Assessment & Plan     Pre-diabetes  Due for recheck A1c.   - Hemoglobin A1c  - Basic metabolic panel  (Ca, Cl, CO2, Creat, Gluc, K, Na, BUN)  - TSH with free T4 reflex    Chest discomfort  EKG done in clinic due to chest symptoms. This shows sinus tachycardia (see below). I have very low suspicion for underlying CAD given not typical of anginal symptoms. His description of pain is consistent with MSK etiology and seems very related to his multiple joint pains. If symptoms persist despite therapies outlined below, can consider further evaluation with stress EKG.   - EKG 12-lead complete w/read - Clinics    Vitamin D deficiency  Recheck due to low levels 9/2020.   - Vitamin D Deficiency    Multiple joint pain  Previous negative lab work up and no objective findings of joint swelling, redness on exam. ?Fibromyalgia. Discussed conservative strategies for pain including physiatry, acupuncture, curcumin. Provided reassurance that joint cracking is not worrisome. Follow up as needed.   - PHYSIATRY REFERRAL    Sinus tachycardia  Patient's initial HR was 144 and subsequently down to 111 on recheck. He admits to feeling a little anxious today. Denies palpitations. Per chart review, he has had tachycardia at multiple other clinic visits up to low 140s. We reviewed his apple watch data which show that his resting HR varies in normal range, however he did have a reading in the low 180s and has multiple in the 140-160s range despite just walking for exercise. No delta waves seen on EKG today and he is in sinus tachycardia. States he has not drank any liquids this morning and is feeling thirsty. Has not had orthostatic symptoms or bleeding symptoms. Will order Holter to make sure this is physiologic sinus tachycardia rather than true pathology.   - Leadless EKG Monitor 3 to 7 Days    35 minutes spent on the date of the encounter doing chart review, history and exam, documentation and further activities per the note        Jia Shaikh MD  [facility]    Nikki Elise is a 45 year old with PMH prediabetes who presents for the following health issues     HPI     Multiple joint pains:  Joint pain and whole body ache going on for a while.  Seen for this last in Sept 2020 and had negative labs at the time--RF, CHARLIE, CBC, ESR, CMP.  Also seen for non specific dizziness at that time.  Pains are ongoing. Located both shoulder, elbows. Not wrist or fingers.  Also both hips, ankles, knees.  He has tightness in his chest too going on since December and constant.  Joint pains are present daily but fluctuate in intensity.   Wakes with pain and persists through the day.  No change with exercise.   Also notes cracking in his right elbow and sometimes in other joints and this worries him.  No joint swelling or redness.   Rates pain 6/10 but also says it's just a mild discomfort. Same with chest tightness.   Pains are better after a hot bath.  Chest pains are located bilateral upper and lower ribs.  Seem to be related to joint pains.   Not worsened with exercise. Not associated with shortness of breath or cough.  No heart palpitations. No orthopnea, extremity swelling.  Quit smoking 25 years ago. No alcohol use.    Denies mood concerns. Feels anxious when having symptoms. Stress is low.    HTN - does check at home. 120-130/80-90 at home.     Tachycardia - Noted in clinic today. He wears an apple watch. Resting rates range between 70-80s. Does have some highs 140-180. These are not resting.   He walks for exercise  .    Objective    BP (!) 137/90   Pulse 111   Temp 97.8  F (36.6  C) (Tympanic)   Ht 1.829 m (6')   Wt 83.9 kg (185 lb)   SpO2 98%   BMI 25.09 kg/m    Body mass index is 25.09 kg/m .  Physical Exam   GENERAL: healthy, alert and no distress  EYES: Eyes grossly normal to inspection, PERRL and conjunctivae and sclerae normal  NECK: no adenopathy, no asymmetry, masses, or scars and thyroid normal to  palpation  RESP: lungs clear to auscultation - no rales, rhonchi or wheezes  CV: regular rhythm, normal S1 S2, no S3 or S4, no murmur, click or rub, no peripheral edema and peripheral pulses strong  ABDOMEN: soft, nontender, no hepatosplenomegaly, no masses and bowel sounds normal  MS: no gross musculoskeletal defects noted, no edema  SKIN: no suspicious lesions or rashes  NEURO: Normal strength and tone, mentation intact and speech normal  PSYCH: mentation appears normal, affect normal    EKG - Reviewed and interpreted by me sinus tachycardia, normal axis, normal intervals, no acute ST/T changes c/w ischemia

## 2021-05-17 NOTE — RESULT ENCOUNTER NOTE
Hiruy,    Your vitamin D3 is very low. Supplement with 5000 IUs vitamin D3 daily. Your A1c is still in a prediabetic range. Work on healthy diet and exercise. If you would like to see a dietician, please let me know.     Jia Shaikh MD  [facility] [place]  [phone]

## 2021-05-17 NOTE — TELEPHONE ENCOUNTER
Spoke with patient in regards to scheduling. He would like to check with his insurance for coverage before making an appointment. Phone number given for scheduling at his convenience.

## 2021-05-24 ENCOUNTER — ANCILLARY PROCEDURE (OUTPATIENT)
Dept: CARDIOLOGY | Facility: CLINIC | Age: 46
End: 2021-05-24
Payer: COMMERCIAL

## 2021-05-24 DIAGNOSIS — R00.0 SINUS TACHYCARDIA: ICD-10-CM

## 2021-05-24 PROCEDURE — 93242 EXT ECG>48HR<7D RECORDING: CPT

## 2021-05-24 PROCEDURE — 93244 EXT ECG>48HR<7D REV&INTERPJ: CPT | Performed by: INTERNAL MEDICINE

## 2021-05-24 NOTE — PROGRESS NOTES
Per Dr. Jia Shaikh, patient to have 3 Day Zio monitor placed.  Diagnosis: R00.0, Sinus Tachy  Monitor placed: Yes  Patient Instructed: Yes  Patient verbalized understanding: Yes  Holter # 3    Monitor placed by Naresh Peters CMA  12:12 PM

## 2021-06-17 ENCOUNTER — TELEPHONE (OUTPATIENT)
Dept: FAMILY MEDICINE | Facility: CLINIC | Age: 46
End: 2021-06-17

## 2021-06-17 DIAGNOSIS — R00.0 SINUS TACHYCARDIA: ICD-10-CM

## 2021-06-17 DIAGNOSIS — R07.89 CHEST DISCOMFORT: Primary | ICD-10-CM

## 2021-06-18 NOTE — TELEPHONE ENCOUNTER
I spoke with patient regarding results.    Holter monitor reviewed and shows one episode of what appears to be type II second degree AV block and episode of SVT with large variation in sinus rhythm (31 to 197 bpm). He was also endorsing atypical chest pain symptoms at visit on 5/14. Please see my note. I have placed referral to cardiology to review Holter study and weigh in on whether he should have any further testing regarding chest symptoms.     Jia Shaikh MD  [facility]  [phone]

## 2021-06-18 NOTE — RESULT ENCOUNTER NOTE
Bennett Elise,    I have reviewed your EKG results. There were a couple of small abnormalities  that I think should be ran by cardiology through a consultation. I would also like them to review your chest pain symptoms that you talked to me about at your last visit.  I am placing a referral for you and you will be contacted to have this visit set up. Please let me know if you have any questions before then.    Jia Shaikh MD  [facility]  [phone]

## 2021-06-24 ENCOUNTER — TELEPHONE (OUTPATIENT)
Dept: CARDIOLOGY | Facility: CLINIC | Age: 46
End: 2021-06-24

## 2021-08-02 ENCOUNTER — OFFICE VISIT (OUTPATIENT)
Dept: CARDIOLOGY | Facility: CLINIC | Age: 46
End: 2021-08-02
Attending: INTERNAL MEDICINE
Payer: COMMERCIAL

## 2021-08-02 VITALS — OXYGEN SATURATION: 100 % | BODY MASS INDEX: 25.9 KG/M2 | WEIGHT: 185 LBS | HEIGHT: 71 IN

## 2021-08-02 DIAGNOSIS — R00.0 SINUS TACHYCARDIA: Primary | ICD-10-CM

## 2021-08-02 PROCEDURE — 93005 ELECTROCARDIOGRAM TRACING: CPT

## 2021-08-02 PROCEDURE — 99203 OFFICE O/P NEW LOW 30 MIN: CPT | Performed by: INTERNAL MEDICINE

## 2021-08-02 PROCEDURE — G0463 HOSPITAL OUTPT CLINIC VISIT: HCPCS | Mod: 25

## 2021-08-02 ASSESSMENT — MIFFLIN-ST. JEOR: SCORE: 1756.28

## 2021-08-02 ASSESSMENT — PAIN SCALES - GENERAL: PAINLEVEL: NO PAIN (1)

## 2021-08-02 NOTE — LETTER
"8/2/2021      RE: Gosia Millan  [address]  [place] [address]       Dear Colleague,    Thank you for the opportunity to participate in the care of your patient, Gosia Millan, at the [facility] [place] [facility]. Please see a copy of my visit note below.          Clinical Cardiac Electrophysiology    Chief Complaint: sinus tachycardia    HPI: Patient seen in Electrophysiology consultation for the above. He was noted at a routine follow-up visit to have sinus tachycardia. This has been apparent at other clinic visits. He admits he feels anxious in the doctors office. His watch does show high heart rates at times as well.     He had Zio (see below) that I have personally reviewed. The average heart rate is normal but he does have significant sinus tachycardia at times. However, his events were NOT associated with the very fast heart rates but rather either normal sinus rhythm or very mild sinus tachycardia.    Current Outpatient Medications   Medication Sig Dispense Refill     ibuprofen (ADVIL/MOTRIN) 200 MG tablet Take 200 mg by mouth every 4 hours as needed for mild pain           Family History   Problem Relation Age of Onset     Diabetes Father      Diabetes Brother        Social History     Tobacco Use     Smoking status: Never Smoker     Smokeless tobacco: Never Used   Substance Use Topics     Alcohol use: Yes     Comment: once every month        No Known Allergies    Physical examination:  Ht 1.803 m (5' 11\")   Wt 83.9 kg (185 lb)   SpO2 100%   BMI 25.80 kg/m     Orthostatic Vitals  BP Pulse Position Site Cuff Size Time Date   131/88 125 Supine Right arm Adult Regular  1:48 PM 8/2/2021   129/88 127 Sitting Right arm Adult Regular  2:02 PM 8/2/2021   139/90 138 Standing Right arm Adult Regular  2:04 PM 8/2/2021   141/93 138 Standing Right arm Adult Regular  2:05 PM 8/2/2021   143/95 136 Standing Right arm Adult " Regular  2:06 PM 8/2/2021         GENERAL APPEARANCE: healthy, alert and no distress  NECK: no venous distention  RESPIRATORY: lungs clear to auscultation - no rales, rhonchi or wheezes  CARDIOVASCULAR: regular, tachycardic, normal S1 S2, no S3,S4, murmur, click or rub, precordium quiet   ABDOMEN: soft, non tender with normal bowel sounds   EXTREMITIES: no edema    Laboratory and diagnostic data personally reviewed August 2, 2021:          Results for KENIA BOSS (MRN [id number]) as of 8/2/2021 14:24   Ref. Range 5/14/2021 11:56   TSH Latest Ref Range: 0.40 - 4.00 mU/L 1.07     Results for KENIA BOSS (MRN [id number]) as of 8/2/2021 14:24   Ref. Range 9/8/2020 10:27   Hemoglobin Latest Ref Range: 13.3 - 17.7 g/dL 16.6           Assessment and recommendations:    1. Sinus tachycardia - he does have a resting tachycardia today, which increases with standing. However, this does not meet the diagnostic criteria for postural tachycardia syndrome (POTS), primarily because he does not have any postural symptoms.     I've recommended an echocardiogram to ensure there is no underlying structural heart disease. His TSH and Hgb are normal.    I appreciate the chance to help with Mr. Boss's care. Please let me know if you have any questions or concerns.      Please do not hesitate to contact me if you have any questions/concerns.     Sincerely,     Chele Jain MD

## 2021-08-02 NOTE — PATIENT INSTRUCTIONS
You were seen in the Electrophysiology Clinic today by: Dr. Jain    Plan:     Medication Changes:       Labs/Tests Needed:      Follow up visit:      Further Instructions:      Your Care Team:  EP Cardiology   Telephone Number     Nurse Line [phone]     For scheduling appts or procedures:    Letty Campbell   [phone]   For the Device Clinic (Pacemakers, ICDs, Loop Recorders)    During business hours: [phone]  After business hours:   [phone]- select option 4 and ask for job code [id number].     On-call cardiologist for after hours or on weekends: [phone], option #4, and ask to speak to the on-call cardiologist.     Cardiovascular Clinic:   [address] [facility] [address]      As always, Thank you for trusting us with your health care needs!

## 2021-08-02 NOTE — PROGRESS NOTES
"      Clinical Cardiac Electrophysiology    Chief Complaint: sinus tachycardia    HPI: Patient seen in Electrophysiology consultation for the above. He was noted at a routine follow-up visit to have sinus tachycardia. This has been apparent at other clinic visits. He admits he feels anxious in the doctors office. His watch does show high heart rates at times as well.     He had Zio (see below) that I have personally reviewed. The average heart rate is normal but he does have significant sinus tachycardia at times. However, his events were NOT associated with the very fast heart rates but rather either normal sinus rhythm or very mild sinus tachycardia.    Current Outpatient Medications   Medication Sig Dispense Refill     ibuprofen (ADVIL/MOTRIN) 200 MG tablet Take 200 mg by mouth every 4 hours as needed for mild pain           Family History   Problem Relation Age of Onset     Diabetes Father      Diabetes Brother        Social History     Tobacco Use     Smoking status: Never Smoker     Smokeless tobacco: Never Used   Substance Use Topics     Alcohol use: Yes     Comment: once every month        No Known Allergies    Physical examination:  Ht 1.803 m (5' 11\")   Wt 83.9 kg (185 lb)   SpO2 100%   BMI 25.80 kg/m     Orthostatic Vitals  BP Pulse Position Site Cuff Size Time Date   131/88 125 Supine Right arm Adult Regular  1:48 PM 8/2/2021   129/88 127 Sitting Right arm Adult Regular  2:02 PM 8/2/2021   139/90 138 Standing Right arm Adult Regular  2:04 PM 8/2/2021   141/93 138 Standing Right arm Adult Regular  2:05 PM 8/2/2021   143/95 136 Standing Right arm Adult Regular  2:06 PM 8/2/2021         GENERAL APPEARANCE: healthy, alert and no distress  NECK: no venous distention  RESPIRATORY: lungs clear to auscultation - no rales, rhonchi or wheezes  CARDIOVASCULAR: regular, tachycardic, normal S1 S2, no S3,S4, murmur, click or rub, precordium quiet   ABDOMEN: soft, non tender with normal bowel sounds   EXTREMITIES: " no edema    Laboratory and diagnostic data personally reviewed August 2, 2021:          Results for KENIA BOSS (MRN [id number]) as of 8/2/2021 14:24   Ref. Range 5/14/2021 11:56   TSH Latest Ref Range: 0.40 - 4.00 mU/L 1.07     Results for KENIA BOSS (MRN [id number]) as of 8/2/2021 14:24   Ref. Range 9/8/2020 10:27   Hemoglobin Latest Ref Range: 13.3 - 17.7 g/dL 16.6           Assessment and recommendations:    1. Sinus tachycardia - he does have a resting tachycardia today, which increases with standing. However, this does not meet the diagnostic criteria for postural tachycardia syndrome (POTS), primarily because he does not have any postural symptoms.     I've recommended an echocardiogram to ensure there is no underlying structural heart disease. His TSH and Hgb are normal.    I appreciate the chance to help with Mr. Boss's care. Please let me know if you have any questions or concerns.

## 2021-08-02 NOTE — NURSING NOTE
Chief Complaint   Patient presents with     New Patient     Chest discomfort      Vitals were taken and medications where reconciled. EKG was performed   JUAN ANTONIO Dickey  2:08 PM

## 2021-08-03 LAB
ATRIAL RATE - MUSE: 121 BPM
DIASTOLIC BLOOD PRESSURE - MUSE: NORMAL MMHG
INTERPRETATION ECG - MUSE: NORMAL
P AXIS - MUSE: 110 DEGREES
PR INTERVAL - MUSE: 168 MS
QRS DURATION - MUSE: 100 MS
QT - MUSE: 316 MS
QTC - MUSE: 448 MS
R AXIS - MUSE: -25 DEGREES
SYSTOLIC BLOOD PRESSURE - MUSE: NORMAL MMHG
T AXIS - MUSE: -10 DEGREES
VENTRICULAR RATE- MUSE: 121 BPM

## 2021-08-06 ENCOUNTER — ANCILLARY PROCEDURE (OUTPATIENT)
Dept: CARDIOLOGY | Facility: CLINIC | Age: 46
End: 2021-08-06
Attending: INTERNAL MEDICINE
Payer: COMMERCIAL

## 2021-08-06 DIAGNOSIS — R00.0 SINUS TACHYCARDIA: ICD-10-CM

## 2021-08-06 LAB — LVEF ECHO: NORMAL

## 2021-08-06 PROCEDURE — 93306 TTE W/DOPPLER COMPLETE: CPT | Performed by: INTERNAL MEDICINE

## 2021-08-10 ENCOUNTER — MYC MEDICAL ADVICE (OUTPATIENT)
Dept: CARDIOLOGY | Facility: CLINIC | Age: 46
End: 2021-08-10

## 2021-08-11 ENCOUNTER — MYC MEDICAL ADVICE (OUTPATIENT)
Dept: CARDIOLOGY | Facility: CLINIC | Age: 46
End: 2021-08-11

## 2021-08-13 NOTE — TELEPHONE ENCOUNTER
Radha Spears, CAROLE CNP  You 9 hours ago (11:13 PM)   LV  The low QRS voltage can be a normal variant. The computer read as inferior infarct this does not mean that she had a heart attack and could be a wrong reading by the computer. In the absences of symptoms we would not need to treat.   Thank,s   LVW    Message text        Message relayed to pt   Suma Lyles RN on 8/13/2021 at 8:24 AM

## 2021-10-10 ENCOUNTER — HEALTH MAINTENANCE LETTER (OUTPATIENT)
Age: 46
End: 2021-10-10

## 2022-09-18 ENCOUNTER — HEALTH MAINTENANCE LETTER (OUTPATIENT)
Age: 47
End: 2022-09-18

## 2022-10-24 ENCOUNTER — ANCILLARY PROCEDURE (OUTPATIENT)
Dept: GENERAL RADIOLOGY | Facility: CLINIC | Age: 47
End: 2022-10-24
Attending: FAMILY MEDICINE
Payer: COMMERCIAL

## 2022-10-24 ENCOUNTER — OFFICE VISIT (OUTPATIENT)
Dept: FAMILY MEDICINE | Facility: CLINIC | Age: 47
End: 2022-10-24
Payer: COMMERCIAL

## 2022-10-24 VITALS
BODY MASS INDEX: 22.47 KG/M2 | HEART RATE: 112 BPM | WEIGHT: 161.13 LBS | DIASTOLIC BLOOD PRESSURE: 81 MMHG | RESPIRATION RATE: 16 BRPM | SYSTOLIC BLOOD PRESSURE: 129 MMHG | TEMPERATURE: 97.3 F

## 2022-10-24 DIAGNOSIS — M25.511 CHRONIC RIGHT SHOULDER PAIN: ICD-10-CM

## 2022-10-24 DIAGNOSIS — Z83.2 FAMILY HISTORY OF SARCOIDOSIS: ICD-10-CM

## 2022-10-24 DIAGNOSIS — M25.552 HIP PAIN, LEFT: ICD-10-CM

## 2022-10-24 DIAGNOSIS — Z11.59 NEED FOR HEPATITIS C SCREENING TEST: ICD-10-CM

## 2022-10-24 DIAGNOSIS — R73.03 PRE-DIABETES: ICD-10-CM

## 2022-10-24 DIAGNOSIS — Z12.11 SCREEN FOR COLON CANCER: ICD-10-CM

## 2022-10-24 DIAGNOSIS — Z11.4 SCREENING FOR HIV (HUMAN IMMUNODEFICIENCY VIRUS): ICD-10-CM

## 2022-10-24 DIAGNOSIS — G89.29 CHRONIC RIGHT SHOULDER PAIN: ICD-10-CM

## 2022-10-24 DIAGNOSIS — M25.50 ARTHRALGIA, UNSPECIFIED JOINT: Primary | ICD-10-CM

## 2022-10-24 DIAGNOSIS — M25.551 HIP PAIN, RIGHT: ICD-10-CM

## 2022-10-24 DIAGNOSIS — M79.10 MYALGIA: ICD-10-CM

## 2022-10-24 DIAGNOSIS — R20.2 TINGLING: ICD-10-CM

## 2022-10-24 DIAGNOSIS — M25.50 ARTHRALGIA, UNSPECIFIED JOINT: ICD-10-CM

## 2022-10-24 LAB
BASOPHILS # BLD AUTO: 0 10E3/UL (ref 0–0.2)
BASOPHILS NFR BLD AUTO: 0 %
EOSINOPHIL # BLD AUTO: 0 10E3/UL (ref 0–0.7)
EOSINOPHIL NFR BLD AUTO: 0 %
ERYTHROCYTE [DISTWIDTH] IN BLOOD BY AUTOMATED COUNT: 12.2 % (ref 10–15)
ERYTHROCYTE [SEDIMENTATION RATE] IN BLOOD BY WESTERGREN METHOD: 7 MM/HR (ref 0–15)
HBA1C MFR BLD: 5.9 % (ref 0–5.6)
HCT VFR BLD AUTO: 47.6 % (ref 40–53)
HGB BLD-MCNC: 15.9 G/DL (ref 13.3–17.7)
IMM GRANULOCYTES # BLD: 0 10E3/UL
IMM GRANULOCYTES NFR BLD: 0 %
LYMPHOCYTES # BLD AUTO: 1.3 10E3/UL (ref 0.8–5.3)
LYMPHOCYTES NFR BLD AUTO: 20 %
MCH RBC QN AUTO: 31 PG (ref 26.5–33)
MCHC RBC AUTO-ENTMCNC: 33.4 G/DL (ref 31.5–36.5)
MCV RBC AUTO: 93 FL (ref 78–100)
MONOCYTES # BLD AUTO: 0.3 10E3/UL (ref 0–1.3)
MONOCYTES NFR BLD AUTO: 5 %
NEUTROPHILS # BLD AUTO: 4.8 10E3/UL (ref 1.6–8.3)
NEUTROPHILS NFR BLD AUTO: 75 %
PLATELET # BLD AUTO: 212 10E3/UL (ref 150–450)
RBC # BLD AUTO: 5.13 10E6/UL (ref 4.4–5.9)
RETICS # AUTO: 0.06 10E6/UL (ref 0.03–0.1)
RETICS/RBC NFR AUTO: 1.2 % (ref 0.5–2)
WBC # BLD AUTO: 6.5 10E3/UL (ref 4–11)

## 2022-10-24 PROCEDURE — 85060 BLOOD SMEAR INTERPRETATION: CPT | Performed by: PATHOLOGY

## 2022-10-24 PROCEDURE — 86038 ANTINUCLEAR ANTIBODIES: CPT | Performed by: FAMILY MEDICINE

## 2022-10-24 PROCEDURE — 36415 COLL VENOUS BLD VENIPUNCTURE: CPT | Performed by: FAMILY MEDICINE

## 2022-10-24 PROCEDURE — 73030 X-RAY EXAM OF SHOULDER: CPT | Mod: TC | Performed by: RADIOLOGY

## 2022-10-24 PROCEDURE — 91312 COVID-19,PF,PFIZER BOOSTER BIVALENT: CPT | Performed by: FAMILY MEDICINE

## 2022-10-24 PROCEDURE — 87389 HIV-1 AG W/HIV-1&-2 AB AG IA: CPT | Performed by: FAMILY MEDICINE

## 2022-10-24 PROCEDURE — 99215 OFFICE O/P EST HI 40 MIN: CPT | Mod: 25 | Performed by: FAMILY MEDICINE

## 2022-10-24 PROCEDURE — 86200 CCP ANTIBODY: CPT | Performed by: FAMILY MEDICINE

## 2022-10-24 PROCEDURE — 83036 HEMOGLOBIN GLYCOSYLATED A1C: CPT | Performed by: FAMILY MEDICINE

## 2022-10-24 PROCEDURE — 86431 RHEUMATOID FACTOR QUANT: CPT | Performed by: FAMILY MEDICINE

## 2022-10-24 PROCEDURE — 73502 X-RAY EXAM HIP UNI 2-3 VIEWS: CPT | Mod: TC | Performed by: RADIOLOGY

## 2022-10-24 PROCEDURE — 0124A COVID-19,PF,PFIZER BOOSTER BIVALENT: CPT | Performed by: FAMILY MEDICINE

## 2022-10-24 PROCEDURE — 85045 AUTOMATED RETICULOCYTE COUNT: CPT | Performed by: FAMILY MEDICINE

## 2022-10-24 PROCEDURE — 86803 HEPATITIS C AB TEST: CPT | Performed by: FAMILY MEDICINE

## 2022-10-24 PROCEDURE — 81374 HLA I TYPING 1 ANTIGEN LR: CPT | Performed by: FAMILY MEDICINE

## 2022-10-24 PROCEDURE — 85025 COMPLETE CBC W/AUTO DIFF WBC: CPT | Performed by: FAMILY MEDICINE

## 2022-10-24 PROCEDURE — 99000 SPECIMEN HANDLING OFFICE-LAB: CPT | Performed by: FAMILY MEDICINE

## 2022-10-24 PROCEDURE — 85652 RBC SED RATE AUTOMATED: CPT | Performed by: FAMILY MEDICINE

## 2022-10-24 PROCEDURE — 86666 EHRLICHIA ANTIBODY: CPT | Mod: 90 | Performed by: FAMILY MEDICINE

## 2022-10-24 PROCEDURE — 71046 X-RAY EXAM CHEST 2 VIEWS: CPT | Mod: TC | Performed by: RADIOLOGY

## 2022-10-24 PROCEDURE — 86618 LYME DISEASE ANTIBODY: CPT | Performed by: FAMILY MEDICINE

## 2022-10-24 ASSESSMENT — PATIENT HEALTH QUESTIONNAIRE - PHQ9
SUM OF ALL RESPONSES TO PHQ QUESTIONS 1-9: 0
SUM OF ALL RESPONSES TO PHQ QUESTIONS 1-9: 0

## 2022-10-24 ASSESSMENT — ANXIETY QUESTIONNAIRES
GAD7 TOTAL SCORE: 2
6. BECOMING EASILY ANNOYED OR IRRITABLE: NOT AT ALL
3. WORRYING TOO MUCH ABOUT DIFFERENT THINGS: NOT AT ALL
5. BEING SO RESTLESS THAT IT IS HARD TO SIT STILL: NOT AT ALL
IF YOU CHECKED OFF ANY PROBLEMS ON THIS QUESTIONNAIRE, HOW DIFFICULT HAVE THESE PROBLEMS MADE IT FOR YOU TO DO YOUR WORK, TAKE CARE OF THINGS AT HOME, OR GET ALONG WITH OTHER PEOPLE: NOT DIFFICULT AT ALL
2. NOT BEING ABLE TO STOP OR CONTROL WORRYING: NOT AT ALL
4. TROUBLE RELAXING: NOT AT ALL
1. FEELING NERVOUS, ANXIOUS, OR ON EDGE: SEVERAL DAYS
GAD7 TOTAL SCORE: 2
8. IF YOU CHECKED OFF ANY PROBLEMS, HOW DIFFICULT HAVE THESE MADE IT FOR YOU TO DO YOUR WORK, TAKE CARE OF THINGS AT HOME, OR GET ALONG WITH OTHER PEOPLE?: NOT DIFFICULT AT ALL
7. FEELING AFRAID AS IF SOMETHING AWFUL MIGHT HAPPEN: SEVERAL DAYS
GAD7 TOTAL SCORE: 2
7. FEELING AFRAID AS IF SOMETHING AWFUL MIGHT HAPPEN: SEVERAL DAYS

## 2022-10-24 NOTE — PATIENT INSTRUCTIONS
Dr. Kate Francois and Dr. Deanne Denis are taking new patients.    Your right shoulder and bilateral hip x-ray x-rays to me appear that you have some mild arthritis but I do want radiology to over read the x-rays and we will send you a [employer] message.    For possible arthritis you can use Tylenol as needed.  We do not like to use regular ibuprofen as it can raise your blood pressure and cause stomach upset.  Some people find benefit from over-the-counter glucosamine chondroitin and daily.    Some rheumatologist do steroid injections for arthritis or if needed in the hip that can be done under fluoroscopy at the hospital.    Labs done today.    Rheumatology consult for the joint pain, muscle pain, and family history of sarcoidosis.    Neurology consult for the tingling in your hands that could be possible carpal tunnel syndrome.    Monitor BP, want top number under 140 and bottom number under 90.    Colonoscopy order done.    If you choose to use [employer] to send a provider a message please keep this very brief.  They should only be used for a follow-up questions to a previous appointment.  New concerns should addressed by a phone call to triage, E -visit or an office visit.  Certainly if it is an emergency call 911.  If there are labor related questions please call [facility] or [facility].  Thank-you.    Your lab results will be communicated to you via letter, [employer] message or phone call when they are all back.  Please refrain from sending messages on one specific lab until they are all back.  Thank you.

## 2022-10-24 NOTE — PROGRESS NOTES
Assessment & Plan       ICD-10-CM    1. Arthralgia, unspecified joint  M25.50 Lyme Disease Total Abs Bld with Reflex to Confirm CLIA     HLA-B27 Typing     Anaplasma phagocytoph antibody IgG IgM     Ehrlichia chaffeenis Abys IgG and IgM     Lab Blood Morphology Pathologist Review     Cyclic Citrullinated Peptide Antibody IgG     XR Chest 2 Views     Adult Rheumatology  Referral     Rheumatoid factor     Anti Nuclear Sabra IgG by IFA with Reflex     ESR: Erythrocyte sedimentation rate     Lyme Disease Total Abs Bld with Reflex to Confirm CLIA     HLA-B27 Typing     Anaplasma phagocytoph antibody IgG IgM     Ehrlichia chaffeenis Abys IgG and IgM     Lab Blood Morphology Pathologist Review     Cyclic Citrullinated Peptide Antibody IgG     Rheumatoid factor     Anti Nuclear Sabra IgG by IFA with Reflex     ESR: Erythrocyte sedimentation rate      2. Screen for colon cancer  Z12.11 Colonoscopy Screening  Referral      3. Screening for HIV (human immunodeficiency virus)  Z11.4 HIV Antigen Antibody Combo     HIV Antigen Antibody Combo      4. Need for hepatitis C screening test  Z11.59 Hepatitis C Screen Reflex to HCV RNA Quant and Genotype     Adult Rheumatology  Referral     Hepatitis C Screen Reflex to HCV RNA Quant and Genotype      5. Tingling  R20.2 Adult Neurology  Referral      6. Hip pain, left  M25.552 XR Hip Left 2-3 Views      7. Hip pain, right  M25.551 XR Hip Right 2-3 Views      8. Pre-diabetes  R73.03 Hemoglobin A1c     Hemoglobin A1c      9. Chronic right shoulder pain  M25.511 XR Shoulder Right 2 Views    G89.29       10. Family history of sarcoidosis  Z83.2 XR Chest 2 Views      11. Myalgia  M79.10         Dr. Kate Francois and Dr. Deanne Denis are taking new patients.    Your right shoulder and bilateral hip x-ray x-rays to me appear that you have some mild arthritis but I do want radiology to over read the x-rays and we will send you a [employer] message.    For possible  arthritis you can use Tylenol as needed.  We do not like to use regular ibuprofen as it can raise your blood pressure and cause stomach upset.  Some people find benefit from over-the-counter glucosamine chondroitin and daily.    Some rheumatologist do steroid injections for arthritis or if needed in the hip that can be done under fluoroscopy at the hospital.    Labs done today.    Rheumatology consult for the joint pain, muscle pain, and family history of sarcoidosis.    Neurology consult for the tingling in your hands that could be possible carpal tunnel syndrome.    Monitor BP, want top number under 140 and bottom number under 90.    Colonoscopy order done.    If you choose to use [employer] to send a provider a message please keep this very brief.  They should only be used for a follow-up questions to a previous appointment.  New concerns should addressed by a phone call to triage, E -visit or an office visit.  Certainly if it is an emergency call 911.  If there are labor related questions please call [facility] or [facility].  Thank-you.    Your lab results will be communicated to you via letter, [employer] message or phone call when they are all back.  Please refrain from sending messages on one specific lab until they are all back.  Thank you.              40 minutes spent on the date of the encounter doing chart review, history and exam, documentation and further activities per the note           No follow-ups on file.      Madelyn AVILES  Patient seen and examined with the physician's assistant student.  Mayra Gagnon MD  [facility]    Nikki Elise is a 47 year old, presenting for the following health issues:  Generalized Body Aches (All over body aches and tingling x 2 years, worsening x 1.5 yrs )    Body aches / joint aches:  Began as a kid. His mom has told him that he used to get pain from the elbows and knees down. States his symptoms have been ongoing for  "a long time, but recently became worse.    He states his pain is \"wandering\" and goes to different areas of his body. When he has pain he states his joints crack more. He describes some non-specific aching and tingling that wanders throughout his body and he occassionally gets a burning sensation on the medial sides of his elbows. He states that most of his aching is in his pelvic and shoulder girdle regions- he describes it as more of a discomfort than pain.     Neurologic symptoms:  He gets tingling in his finger tips and weakness in his left hand occassionally.  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    Prediabetes: A1c In May 2021 years ago was a 5.8.  He has intentionally lost about 20 pounds and working on healthy diet and exercise.    Endorses that his brother was recently diagnosed with rheumatoid arthritis and sarcoidosis. He also endorses a family history of colon cancer with his father developing it in his 70's.     He denies any night sweats, unintentional weight loss, muscle weakness, vision changes, rashes, unusual bumps, tick exposures, fevers, chest pain, or SOB. Denies any family history of muscular dystrophy or concern for anxiety or depression. He traveled to [place] a few months ago, but has not traveled to anywhere else outside of the country recently.       History of Present Illness       Reason for visit:  Joint ache  Symptom onset:  More than a month  Symptom intensity:  Moderate  Symptom progression:  Worsening  Had these symptoms before:  Yes  Has tried/received treatment for these symptoms:  Yes  Previous treatment was successful:  No  What makes it worse:  I dont know  What makes it better:  Hot bath    He eats 2-3 servings of fruits and vegetables daily.He consumes 0 sweetened beverage(s) daily.He exercises with enough effort to increase his heart rate 10 to 19 minutes per day.  He exercises with enough effort to increase his heart rate 3 or less days per week. "   He is taking medications regularly.     Review of Systems   Constitutional, HEENT, cardiovascular, pulmonary, GI, , musculoskeletal, neuro, skin, endocrine and psych systems are negative, except as otherwise noted.      Objective    /81 (BP Location: Left arm, Patient Position: Sitting, Cuff Size: Adult Regular)   Pulse 112   Temp 97.3  F (36.3  C) (Oral)   Resp 16   Wt 73.1 kg (161 lb 2 oz)   BMI 22.47 kg/m    Body mass index is 22.47 kg/m .  Physical Exam   GENERAL: healthy, alert and no distress  MS: Rotator cuff tendons tested and strong bilaterally, no pain to palpation of the right shoulder joint, full range of motion of the hips without pain

## 2022-10-25 LAB
B BURGDOR IGG+IGM SER QL: 0.03
HCV AB SERPL QL IA: NONREACTIVE
HIV 1+2 AB+HIV1 P24 AG SERPL QL IA: NONREACTIVE
PATH REPORT.COMMENTS IMP SPEC: NORMAL
PATH REPORT.COMMENTS IMP SPEC: NORMAL
PATH REPORT.FINAL DX SPEC: NORMAL
PATH REPORT.RELEVANT HX SPEC: NORMAL
RHEUMATOID FACT SER NEPH-ACNC: 9 IU/ML

## 2022-10-26 LAB
ANA SER QL IF: NEGATIVE
B LOCUS: NORMAL
B27TEST METHOD: NORMAL
CCP AB SER IA-ACNC: 0.6 U/ML

## 2022-10-28 LAB
A PHAGOCYTOPH IGG TITR SER IF: NORMAL {TITER}
A PHAGOCYTOPH IGM TITR SER IF: NORMAL {TITER}

## 2022-10-29 LAB
E CHAFFEENSIS IGG TITR SER IF: NORMAL {TITER}
E CHAFFEENSIS IGM TITR SER IF: NORMAL {TITER}

## 2022-10-30 DIAGNOSIS — A77.40 EHRLICHIOSIS: Primary | ICD-10-CM

## 2022-10-30 RX ORDER — DOXYCYCLINE HYCLATE 100 MG
100 TABLET ORAL 2 TIMES DAILY
Qty: 28 TABLET | Refills: 0 | Status: SHIPPED | OUTPATIENT
Start: 2022-10-30 | End: 2022-11-13

## 2022-12-21 ENCOUNTER — TELEPHONE (OUTPATIENT)
Dept: GASTROENTEROLOGY | Facility: CLINIC | Age: 47
End: 2022-12-21

## 2022-12-21 DIAGNOSIS — Z12.11 ENCOUNTER FOR SCREENING COLONOSCOPY: Primary | ICD-10-CM

## 2022-12-21 RX ORDER — BISACODYL 5 MG
TABLET, DELAYED RELEASE (ENTERIC COATED) ORAL
Qty: 4 TABLET | Refills: 0 | Status: SHIPPED | OUTPATIENT
Start: 2022-12-21 | End: 2024-08-29

## 2022-12-21 NOTE — TELEPHONE ENCOUNTER
Screening Questions  BLUE  KIND OF PREP RED  LOCATION [review exclusion criteria] GREEN  SEDATION TYPE        y Are you active on mychart?       Kalin Ordering/Referring Provider?        PO What type of coverage do you have?      n Have you had a positive covid test in the last 14 days?     21.6 1. BMI  [BMI 40+ - review exclusion criteria]    y  2. Are you able to give consent for your medical care? [IF NO,RN REVIEW]        n  3. Are you taking any prescription pain medications on a routine schedule?      n  3a. EXTENDED PREP What kind of prescription?     n 4. Do you have any chemical dependencies such as alcohol, street drugs, or methadone?    n 5. Do you have any history of post-traumatic stress syndrome, severe anxiety or history of psychosis?      **If yes 3- 5 , please schedule with MAC sedation.**          IF YES TO ANY 6 - 10 - HOSPITAL SETTING ONLY.     n 6.   Do you need assistance transferring?     n 7.   Have you had a heart or lung transplant?    n 8.   Are you currently on dialysis?   n 9.   Do you use daily home oxygen?   n 10. Do you take nitroglycerin?   10a. n If yes, how often?     11. [FEMALES]  n Are you currently pregnant?    11a. n If yes, how many weeks? [ Greater than 12 weeks, OR NEEDED]    n 12. Do you have Pulmonary Hypertension? *NEED PAC APPT AT [facility]*     n 13. [review exclusion criteria]  Do you have any implantable devices in your body (pacemaker, defib, LVAD)?    n 14. In the past 6 months, have you had any heart related issues including cardiomyopathy or heart attack?     14a. n If yes, did it require cardiac stenting if so when?     n 15. Have you had a stroke or Transient ischemic attack (TIA - aka  mini stroke ) within 6 months?      n 16. Do you have mod to severe Obstructive Sleep Apnea?  [Hospital only - Ok at [place]]    n 17. Do you have SEVERE AND UNCONTROLLED asthma? *NEED PAC APPT AT [facility]*     n 18. Are you currently taking any blood thinners?     18a. If yes,  "inform patient to \"follow up w/ ordering provider for bridging instructions.\"    n 19. Do you take the medication Phentermine?    19a. If yes, \"Hold for 7 days before procedure.  Please consult your prescribing provider if you have questions about holding this medication.\"     n  20. Do you have chronic kidney disease?      n  21. Do you have a diagnosis of diabetes?     y  22. On a regular basis do you go 3-5 days between bowel movements?      23. Preferred LOCAL Pharmacy for Pre Prescription    [ LIST ONLY ONE PHARMACY]     [facility] [id number] [place] [address]        - CLOSING REMINDERS -    Informed patient they will need an adult    Cannot take any type of public or medical transportation alone    Conscious Sedation- Needs  for 6 hours after the procedure       MAC/General-Needs  for 24 hours after procedure    Pre-Procedure Covid test to be completed [[facility] PCR Testing Required]    Confirmed Nurse will call to complete assessment       - SCHEDULING DETAILS -  n Hospital Setting Required? If yes, what is the exclusion?:    Darren  Surgeon    3/28/23  Date of Procedure  Lower Endoscopy [Colonoscopy]  [facility]   STANDARD [facility]-If you answer yes to questions #8, #20, #21Which Colonoscopy Prep was Sent?     MAC Sedation Type     n PAC / Pre-op Required                 "

## 2023-01-28 ENCOUNTER — HEALTH MAINTENANCE LETTER (OUTPATIENT)
Age: 48
End: 2023-01-28

## 2023-02-07 ENCOUNTER — OFFICE VISIT (OUTPATIENT)
Dept: FAMILY MEDICINE | Facility: CLINIC | Age: 48
End: 2023-02-07
Payer: COMMERCIAL

## 2023-02-07 VITALS
DIASTOLIC BLOOD PRESSURE: 84 MMHG | BODY MASS INDEX: 23.16 KG/M2 | WEIGHT: 171 LBS | HEART RATE: 98 BPM | TEMPERATURE: 98.2 F | RESPIRATION RATE: 14 BRPM | HEIGHT: 72 IN | OXYGEN SATURATION: 100 % | SYSTOLIC BLOOD PRESSURE: 133 MMHG

## 2023-02-07 DIAGNOSIS — M25.50 ARTHRALGIA OF MULTIPLE SITES, BILATERAL: ICD-10-CM

## 2023-02-07 DIAGNOSIS — Z83.2 FAMILY HISTORY OF SARCOIDOSIS: ICD-10-CM

## 2023-02-07 DIAGNOSIS — Z76.89 ENCOUNTER TO ESTABLISH CARE WITH NEW DOCTOR: ICD-10-CM

## 2023-02-07 DIAGNOSIS — M54.50 LUMBAR PAIN: ICD-10-CM

## 2023-02-07 DIAGNOSIS — R73.03 PRE-DIABETES: ICD-10-CM

## 2023-02-07 DIAGNOSIS — M54.2 CERVICAL PAIN: Primary | ICD-10-CM

## 2023-02-07 DIAGNOSIS — E55.9 VITAMIN D DEFICIENCY: ICD-10-CM

## 2023-02-07 LAB
ALBUMIN SERPL BCG-MCNC: 4.6 G/DL (ref 3.5–5.2)
ALP SERPL-CCNC: 71 U/L (ref 40–129)
ALT SERPL W P-5'-P-CCNC: 11 U/L (ref 10–50)
ANION GAP SERPL CALCULATED.3IONS-SCNC: 13 MMOL/L (ref 7–15)
AST SERPL W P-5'-P-CCNC: 22 U/L (ref 10–50)
BILIRUB SERPL-MCNC: 0.6 MG/DL
BUN SERPL-MCNC: 12.6 MG/DL (ref 6–20)
CALCIUM SERPL-MCNC: 9.4 MG/DL (ref 8.6–10)
CHLORIDE SERPL-SCNC: 104 MMOL/L (ref 98–107)
CREAT SERPL-MCNC: 0.76 MG/DL (ref 0.67–1.17)
DEPRECATED CALCIDIOL+CALCIFEROL SERPL-MC: 17 UG/L (ref 20–75)
DEPRECATED HCO3 PLAS-SCNC: 23 MMOL/L (ref 22–29)
GFR SERPL CREATININE-BSD FRML MDRD: >90 ML/MIN/1.73M2
GLUCOSE SERPL-MCNC: 117 MG/DL (ref 70–99)
POTASSIUM SERPL-SCNC: 4.3 MMOL/L (ref 3.4–5.3)
PROT SERPL-MCNC: 7.2 G/DL (ref 6.4–8.3)
SODIUM SERPL-SCNC: 140 MMOL/L (ref 136–145)
TSH SERPL DL<=0.005 MIU/L-ACNC: 0.99 UIU/ML (ref 0.3–4.2)

## 2023-02-07 PROCEDURE — 80053 COMPREHEN METABOLIC PANEL: CPT | Performed by: NURSE PRACTITIONER

## 2023-02-07 PROCEDURE — 36415 COLL VENOUS BLD VENIPUNCTURE: CPT | Performed by: NURSE PRACTITIONER

## 2023-02-07 PROCEDURE — 82306 VITAMIN D 25 HYDROXY: CPT | Performed by: NURSE PRACTITIONER

## 2023-02-07 PROCEDURE — 99215 OFFICE O/P EST HI 40 MIN: CPT | Performed by: NURSE PRACTITIONER

## 2023-02-07 PROCEDURE — 84443 ASSAY THYROID STIM HORMONE: CPT | Performed by: NURSE PRACTITIONER

## 2023-02-07 RX ORDER — METHOCARBAMOL 750 MG/1
750 TABLET, FILM COATED ORAL 4 TIMES DAILY PRN
Qty: 120 TABLET | Refills: 0 | Status: SHIPPED | OUTPATIENT
Start: 2023-02-07 | End: 2023-03-24

## 2023-02-07 RX ORDER — NAPROXEN 500 MG/1
500 TABLET ORAL 2 TIMES DAILY PRN
Qty: 60 TABLET | Refills: 1 | Status: SHIPPED | OUTPATIENT
Start: 2023-02-07 | End: 2023-03-24

## 2023-02-07 ASSESSMENT — PAIN SCALES - GENERAL: PAINLEVEL: MODERATE PAIN (5)

## 2023-02-07 NOTE — PROGRESS NOTES
"  Assessment & Plan     Arthralgia of multiple sites, bilateral  Cervical pain  Lumbar pain   Family history of sarcoidosis  Family hx of sarcoidosis; patient w/concerns d/t hereditary component; has pending Rheumatology consult; recent and previous work up autoimmune, lyme all wnl; imaging bilat hip, right shoulder, chest all wnl except mild arthritis to left hip  recommend Physical Therapy, nsaid prn and muscle relaxant; discussed Sarcoidosis arthropathy    - Physical Therapy Referral  - methocarbamol (ROBAXIN) 750 MG tablet  Dispense: 120 tablet; Refill: 0  - TSH with free T4 reflex  - Vitamin D Deficiency  - naproxen (NAPROSYN) 500 MG tablet  Dispense: 60 tablet; Refill: 1  - Comprehensive metabolic panel    Vitamin D deficiency  - Vitamin D Deficiency    Pre-diabetes   low carbohydrate diet, continue exercising ~30 minutes a day (walking after meals is great!),   -rechecking your glucose level in 6 months.     -Encounter to establish care with new doctor  Reviewed chronic health conditions; medications, labs and pertinent health concerns today    40 minutes of the appointment were spent evaluating and developing a treatment plan for her additional concern(s).        No follow-ups on file.    CAROLE Butt [facility]    Nikki Elise is a 47 year old, presenting for the following health issues:  neck, jaw and grinding pain    Patient with family history of sarcoidosis (brother) with rapid onset of muscle weakness, pain to multiples joints and respiratory concerns. Patient denies other family history of autoimmune conditions. Patient very concern of having this condition.     - recently seen by PCP 10/2022 for similar pain right shoulder and bilateral hip pain; imaging completed; referred to Neurology and Rheumatology pending appointments  Today has c/o neck, left shoulder, wrist and back pain  - neck: \"cracking\" \"popping\" feels strain and cracking with openly his " "mouth; started 2 weeks ago  - left shoulder \"cracking\" dull achy pain ongoing over 2 yrs increasing over the past weeks; posterior shoulder pain; no pain with overhead or reaching back; no weakness; no numbness tingling  - lumbar pain: uncomfortable feeling; no pain at sleep;     - establish care    History of Present Illness       Reason for visit:  Neck pain and cracking   shoulder pain  headache  Symptom onset:  1-2 weeks ago  Symptom intensity:  Moderate  Symptom progression:  Worsening  Had these symptoms before:  No  What makes it better:  Hot bath    He eats 2-3 servings of fruits and vegetables daily.He consumes 0 sweetened beverage(s) daily.He exercises with enough effort to increase his heart rate 10 to 19 minutes per day.  He exercises with enough effort to increase his heart rate 3 or less days per week.   He is taking medications regularly.             Review of Systems   Constitutional, HEENT, cardiovascular, pulmonary, gi and gu systems are negative, except as otherwise noted.      Objective    /84 (BP Location: Right arm, Patient Position: Sitting, Cuff Size: Adult Regular)   Pulse 98   Temp 98.2  F (36.8  C) (Temporal)   Resp 14   Ht 1.829 m (6')   Wt 77.6 kg (171 lb)   SpO2 100%   BMI 23.19 kg/m    Body mass index is 23.19 kg/m .  Physical Exam   GENERAL: healthy, alert and no distress  NECK: no adenopathy, no asymmetry, masses, or scars and thyroid normal to palpation  RESP: lungs clear to auscultation - no rales, rhonchi or wheezes  CV: regular rate and rhythm, normal S1 S2, no S3 or S4, no murmur, click or rub, no peripheral edema and peripheral pulses strong  MS: no gross musculoskeletal defects noted, no edema    No results found for any visits on 02/07/23.  No results found for this visit on 02/07/23.  No results found.                "

## 2023-03-09 ENCOUNTER — OFFICE VISIT (OUTPATIENT)
Dept: NEUROLOGY | Facility: CLINIC | Age: 48
End: 2023-03-09
Attending: FAMILY MEDICINE
Payer: COMMERCIAL

## 2023-03-09 VITALS
HEART RATE: 105 BPM | DIASTOLIC BLOOD PRESSURE: 86 MMHG | OXYGEN SATURATION: 100 % | SYSTOLIC BLOOD PRESSURE: 132 MMHG | WEIGHT: 171 LBS | HEIGHT: 72 IN | BODY MASS INDEX: 23.16 KG/M2

## 2023-03-09 DIAGNOSIS — R20.2 ARM PARESTHESIA, LEFT: Primary | ICD-10-CM

## 2023-03-09 DIAGNOSIS — R20.2 PARESTHESIA OF UPPER AND LOWER EXTREMITIES OF BOTH SIDES: ICD-10-CM

## 2023-03-09 DIAGNOSIS — M54.2 CERVICAL PAIN: ICD-10-CM

## 2023-03-09 PROCEDURE — 99215 OFFICE O/P EST HI 40 MIN: CPT | Performed by: PSYCHIATRY & NEUROLOGY

## 2023-03-09 NOTE — LETTER
3/9/2023         RE: Gosia Millan  [address]  [place] [address]        Dear Colleague,    Thank you for referring your patient, Gosia Millan, to the [facility] NEUROLOGY CLINICS [facility]. Please see a copy of my visit note below.    ESTABLISHED PATIENT NEUROLOGY NOTE    DATE OF VISIT: 3/9/2023  CLINIC LOCATION: [facility]  MRN: [id number]  PATIENT NAME: Gosia Millan  YOB: 1975    REASON FOR VISIT:   Chief Complaint   Patient presents with     Tingling     Tingling and aches and pains throughout body     SUBJECTIVE:                                                      HISTORY OF PRESENT ILLNESS: Patient, previously seen once on 9/16/2020 for dizziness, presents to discuss a new problem, limb paresthesias along with left neck/shoulder pain radiating to the left arm. Please refer to my initial notes for further information.    Since the last visit, the patient reports that his symptoms started several years ago.  He experiences pain and tingling in his entire body in addition to left shoulder pain, all joint pain and cracking sound that is worse on the left side of his body (left arm, hand, and leg).  Paresthesias are constant but varied intensity in both hands, mainly fingertips, and both feet and lower legs.  He also experiences chronic cervical pain that radiates to his left upper extremity.  Hot baths seem to make symptoms better.  No other aggravating or alleviating factors.  He denies interval development of additional new focal neurological symptoms.    His initial work-up in 2020 included unremarkable brain MRI with and without contrast, low normal vitamin B12 (215), normal B1, TSH, methylmalonic acid and low vitamin D (19).    Recent laboratory evaluation from February 2023 includes unremarkable CMP (except elevated glucose of 117), low vitamin D (17), and normal TSH (0.99). Laboratory evaluation in October 2022 also included normal sed  rate, serology for tickborne illnesses, including Anaplasma, Ehrlichia, and Lyme, negative HIV/hep C, normal CBC, normal rheumatoid factor, elevated hemoglobin A1C of 5.9, and normal CCP.  EXAM:                                                    Physical Exam:   Vitals: /86   Pulse 105   Ht 1.829 m (6')   Wt 77.6 kg (171 lb)   SpO2 100%   BMI 23.19 kg/m      General: pt is in NAD, cooperative.  Skin: normal turgor, moist mucous membranes, no lesions/rashes noticed.  HEENT: ATNC, white sclera, normal conjunctiva.  Respiratory: Symmetric lung excursion, no accessory respiratory muscle use.  Abdomen: Non distended.  Neurological: awake, cooperative, follows commands, no aphasia or dysarthria noted, cranial nerves II-XII: no ptosis, extraocular motility is full, face is symmetric, tongue is midline, equally moves all extremities, strength is 5/5 bilaterally, deep tendon reflexes are preserved bilaterally, sensation to the light touch, pinprick, and vibration is intact bilaterally, finger to nose and heel-knee-shin tests are intact bilaterally, casual/tandem gait is normal.  ASSESSMENT AND PLAN:                                                    Assessment: 47 year old male patient, previously seen once for dizziness in September 2020, presents to discuss new symptoms, including bilateral distal limb paresthesias along with left neck/shoulder pain radiating to the left arm.  His neurologic exam is nonfocal.  Hemoglobin A1C remains elevated at 5.9.  His previous B12 was in low normal range (215) with normal MMA in 2020.      We discussed that his symptoms might be due to mild sensory peripheral polyneuropathy due to prediabetes and B12 deficiency, although clinically I do not see any evidence of it on neurological exam.  We discussed the utility of obtaining EMG, but decided to recheck his B12 level while he works on blood sugar reduction.    Regarding his left neck/shoulder pain radiating to the left lower  extremity, it might be due to cervical radiculopathy.  I would suggest obtaining cervical spine MRI to evaluate for possible causes.  We discussed that EMG and other testing might be needed after that.    Diagnoses:    ICD-10-CM    1. Arm paresthesia, left  R20.2 MR Cervical Spine w/o Contrast      2. Cervical pain  M54.2 Adult Neurology  Referral     MR Cervical Spine w/o Contrast      3. Paresthesia of upper and lower extremities of both sides  R20.2 Vitamin B12     Methylmalonic Acid        Plan: At today's visit we thoroughly discussed currrent symptoms, necessary evaluation, and the plan, which includes:  Orders Placed This Encounter   Procedures     MR Cervical Spine w/o Contrast     Vitamin B12     Methylmalonic Acid     No new medications.     Additional recommendations after the work-up.    Next follow-up appointment is in the next 2 weeks or earlier if needed.    Total Time: 43 minutes spent on the date of the encounter doing chart review, history and exam, documentation and further activities per the note.    Dimitri Ward MD  [facility] Neurology  (Chart documentation was completed in part with Dragon voice-recognition software. Even though reviewed, some grammatical, spelling, and word errors may remain.)        Again, thank you for allowing me to participate in the care of your patient.        Sincerely,        Dimitri Ward MD

## 2023-03-09 NOTE — PROGRESS NOTES
ESTABLISHED PATIENT NEUROLOGY NOTE    DATE OF VISIT: 3/9/2023  CLINIC LOCATION: [facility]  MRN: [id number]  PATIENT NAME: Gosia Millan  YOB: 1975    REASON FOR VISIT:   Chief Complaint   Patient presents with     Tingling     Tingling and aches and pains throughout body     SUBJECTIVE:                                                      HISTORY OF PRESENT ILLNESS: Patient, previously seen once on 9/16/2020 for dizziness, presents to discuss a new problem, limb paresthesias along with left neck/shoulder pain radiating to the left arm. Please refer to my initial notes for further information.    Since the last visit, the patient reports that his symptoms started several years ago.  He experiences pain and tingling in his entire body in addition to left shoulder pain, all joint pain and cracking sound that is worse on the left side of his body (left arm, hand, and leg).  Paresthesias are constant but varied intensity in both hands, mainly fingertips, and both feet and lower legs.  He also experiences chronic cervical pain that radiates to his left upper extremity.  Hot baths seem to make symptoms better.  No other aggravating or alleviating factors.  He denies interval development of additional new focal neurological symptoms.    His initial work-up in 2020 included unremarkable brain MRI with and without contrast, low normal vitamin B12 (215), normal B1, TSH, methylmalonic acid and low vitamin D (19).    Recent laboratory evaluation from February 2023 includes unremarkable CMP (except elevated glucose of 117), low vitamin D (17), and normal TSH (0.99). Laboratory evaluation in October 2022 also included normal sed rate, serology for tickborne illnesses, including Anaplasma, Ehrlichia, and Lyme, negative HIV/hep C, normal CBC, normal rheumatoid factor, elevated hemoglobin A1C of 5.9, and normal CCP.  EXAM:                                                    Physical Exam:   Vitals:  /86   Pulse 105   Ht 1.829 m (6')   Wt 77.6 kg (171 lb)   SpO2 100%   BMI 23.19 kg/m      General: pt is in NAD, cooperative.  Skin: normal turgor, moist mucous membranes, no lesions/rashes noticed.  HEENT: ATNC, white sclera, normal conjunctiva.  Respiratory: Symmetric lung excursion, no accessory respiratory muscle use.  Abdomen: Non distended.  Neurological: awake, cooperative, follows commands, no aphasia or dysarthria noted, cranial nerves II-XII: no ptosis, extraocular motility is full, face is symmetric, tongue is midline, equally moves all extremities, strength is 5/5 bilaterally, deep tendon reflexes are preserved bilaterally, sensation to the light touch, pinprick, and vibration is intact bilaterally, finger to nose and heel-knee-shin tests are intact bilaterally, casual/tandem gait is normal.  ASSESSMENT AND PLAN:                                                    Assessment: 47 year old male patient, previously seen once for dizziness in September 2020, presents to discuss new symptoms, including bilateral distal limb paresthesias along with left neck/shoulder pain radiating to the left arm.  His neurologic exam is nonfocal.  Hemoglobin A1C remains elevated at 5.9.  His previous B12 was in low normal range (215) with normal MMA in 2020.      We discussed that his symptoms might be due to mild sensory peripheral polyneuropathy due to prediabetes and B12 deficiency, although clinically I do not see any evidence of it on neurological exam.  We discussed the utility of obtaining EMG, but decided to recheck his B12 level while he works on blood sugar reduction.    Regarding his left neck/shoulder pain radiating to the left lower extremity, it might be due to cervical radiculopathy.  I would suggest obtaining cervical spine MRI to evaluate for possible causes.  We discussed that EMG and other testing might be needed after that.    Diagnoses:    ICD-10-CM    1. Arm paresthesia, left  R20.2 MR  Cervical Spine w/o Contrast      2. Cervical pain  M54.2 Adult Neurology  Referral     MR Cervical Spine w/o Contrast      3. Paresthesia of upper and lower extremities of both sides  R20.2 Vitamin B12     Methylmalonic Acid        Plan: At today's visit we thoroughly discussed currrent symptoms, necessary evaluation, and the plan, which includes:  Orders Placed This Encounter   Procedures     MR Cervical Spine w/o Contrast     Vitamin B12     Methylmalonic Acid     No new medications.     Additional recommendations after the work-up.    Next follow-up appointment is in the next 2 weeks or earlier if needed.    Total Time: 43 minutes spent on the date of the encounter doing chart review, history and exam, documentation and further activities per the note.    Dimitri Ward MD  [facility] Neurology  (Chart documentation was completed in part with Dragon voice-recognition software. Even though reviewed, some grammatical, spelling, and word errors may remain.)

## 2023-03-09 NOTE — PATIENT INSTRUCTIONS
AFTER VISIT SUMMARY (AVS):    At today's visit we thoroughly discussed current symptoms, necessary evaluation, and the plan, which includes:  Orders Placed This Encounter   Procedures    MR Cervical Spine w/o Contrast    Vitamin B12    Methylmalonic Acid     No new medications.     Additional recommendations after the work-up.    Next follow-up appointment is in the next 2 weeks or earlier if needed.    Please do not hesitate to call me with any questions or concerns.    Thanks.

## 2023-03-09 NOTE — NURSING NOTE
Gosia Millan is a 47 year old male who presents for:  Chief Complaint   Patient presents with     Tingling     Tingling and aches and pains throughout body        Initial Vitals:  /86   Pulse 105   Ht 1.829 m (6')   Wt 77.6 kg (171 lb)   SpO2 100%   BMI 23.19 kg/m   Estimated body mass index is 23.19 kg/m  as calculated from the following:    Height as of this encounter: 1.829 m (6').    Weight as of this encounter: 77.6 kg (171 lb).. Body surface area is 1.99 meters squared. BP completed using cuff size: small regular      Ning Rathai

## 2023-03-09 NOTE — PROGRESS NOTES
ESTABLISHED PATIENT NEUROLOGY NOTE    DATE OF VISIT: 3/9/2023  CLINIC LOCATION: [facility]  MRN: [id number]  PATIENT NAME: Gosia Millan  YOB: 1975    REASON FOR VISIT: No chief complaint on file.    SUBJECTIVE:                                                      HISTORY OF PRESENT ILLNESS: Patient, previously seen once on 9/16/2020 for dizziness, presents to discuss a new problem, bilateral hand paresthesias. Please refer to my initial notes for further information.    Since the last visit, the patient reports that his symptoms started *** ago.  He experiences pain and tingling in his entire body in addition to shoulder pain, all joint pain and cracking sound that is worse on the left side of his body (left arm, hand, and leg).  Hot baths seem to make symptoms better.  No other aggravating or alleviating factors.  He denies interval development of additional new focal neurological symptoms.    His initial work-up in 2020 included unremarkable brain MRI with and without contrast, normal vitamin B12, B1, TSH, methylmalonic acid and low vitamin D.    Recent laboratory evaluation from February 2023 includes unremarkable CMP (except elevated glucose of 117), low vitamin D (17), and normal TSH (0.99). Laboratory evaluation in October 2022 also included normal sed rate, serology for tickborne illnesses, including Anaplasma, Ehrlichia, and Lyme, negative HIV/hep C, normal CBC, normal rheumatoid factor, elevated hemoglobin A1C of 5.9, and normal CCP.  EXAM:                                                    Physical Exam:   Vitals: There were no vitals taken for this visit.    General: pt is in NAD, cooperative.  Skin: normal turgor, moist mucous membranes, no lesions/rashes noticed.  HEENT: ATNC, white sclera, normal conjunctiva.  Respiratory: Symmetric lung excursion, no accessory respiratory muscle use.  Abdomen: Non distended.  Neurological: awake, cooperative, follows commands, no  aphasia or dysarthria noted, cranial nerves II-XII: no ptosis, extraocular motility is full, face is symmetric, tongue is midline, equally moves all extremities, normal tone in upper and lower extremities, sensation to the light touch is intact bilaterally, pronator drift is negative, finger to nose intact bilaterally, casual gait is normal.  ASSESSMENT AND PLAN:                                                    Assessment: 47 year old male patient presents for follow-up of ***.    Diagnoses:  No diagnosis found.  Plan:  There are no Patient Instructions on file for this visit.    Total Time: *** minutes spent on the date of the encounter doing chart review, history and exam, documentation and further activities per the note.    Dimitri Ward MD  [facility] Neurology  (Chart documentation was completed in part with Dragon voice-recognition software. Even though reviewed, some grammatical, spelling, and word errors may remain.)

## 2023-03-13 ENCOUNTER — ANCILLARY PROCEDURE (OUTPATIENT)
Dept: MRI IMAGING | Facility: CLINIC | Age: 48
End: 2023-03-13
Attending: PSYCHIATRY & NEUROLOGY
Payer: COMMERCIAL

## 2023-03-13 ENCOUNTER — LAB (OUTPATIENT)
Dept: LAB | Facility: CLINIC | Age: 48
End: 2023-03-13
Payer: COMMERCIAL

## 2023-03-13 DIAGNOSIS — R20.2 PARESTHESIA OF UPPER AND LOWER EXTREMITIES OF BOTH SIDES: ICD-10-CM

## 2023-03-13 DIAGNOSIS — R20.2 ARM PARESTHESIA, LEFT: ICD-10-CM

## 2023-03-13 DIAGNOSIS — M54.2 CERVICAL PAIN: ICD-10-CM

## 2023-03-13 LAB — VIT B12 SERPL-MCNC: 307 PG/ML (ref 232–1245)

## 2023-03-13 PROCEDURE — 83921 ORGANIC ACID SINGLE QUANT: CPT | Performed by: PSYCHIATRY & NEUROLOGY

## 2023-03-13 PROCEDURE — 72141 MRI NECK SPINE W/O DYE: CPT | Mod: GC | Performed by: STUDENT IN AN ORGANIZED HEALTH CARE EDUCATION/TRAINING PROGRAM

## 2023-03-13 PROCEDURE — 82607 VITAMIN B-12: CPT | Performed by: PSYCHIATRY & NEUROLOGY

## 2023-03-13 PROCEDURE — 36415 COLL VENOUS BLD VENIPUNCTURE: CPT | Performed by: PATHOLOGY

## 2023-03-15 ENCOUNTER — OFFICE VISIT (OUTPATIENT)
Dept: NEUROLOGY | Facility: CLINIC | Age: 48
End: 2023-03-15
Payer: COMMERCIAL

## 2023-03-15 VITALS — SYSTOLIC BLOOD PRESSURE: 136 MMHG | DIASTOLIC BLOOD PRESSURE: 86 MMHG | OXYGEN SATURATION: 100 % | HEART RATE: 99 BPM

## 2023-03-15 DIAGNOSIS — R20.2 ARM PARESTHESIA, LEFT: ICD-10-CM

## 2023-03-15 DIAGNOSIS — R20.2 PARESTHESIA OF UPPER AND LOWER EXTREMITIES OF BOTH SIDES: ICD-10-CM

## 2023-03-15 DIAGNOSIS — M54.2 CERVICAL PAIN: Primary | ICD-10-CM

## 2023-03-15 PROCEDURE — 99213 OFFICE O/P EST LOW 20 MIN: CPT | Performed by: PSYCHIATRY & NEUROLOGY

## 2023-03-15 NOTE — LETTER
3/15/2023         RE: Gosia Millan  [address]  [place] [address]        Dear Colleague,    Thank you for referring your patient, Gosia Millan, to the [facility] NEUROLOGY CLINICS [facility]. Please see a copy of my visit note below.    ESTABLISHED PATIENT NEUROLOGY NOTE    DATE OF VISIT: 3/15/2023  CLINIC LOCATION: [facility]  MRN: [id number]  PATIENT NAME: Gosia Millan  YOB: 1975    REASON FOR VISIT:   Chief Complaint   Patient presents with     Follow Up     Review Imaging      SUBJECTIVE:                                                      HISTORY OF PRESENT ILLNESS: Patient is here to follow up regarding limb paresthesias and left neck/shoulder pain radiating to the left arm. Please refer to my initial/other prior notes for further information.    Since the last visit, the patient reports some improvement in his neck discomfort, but no other symptom changes.  He denies interval development of new focal neurological symptoms.    Laboratory evaluation demonstrated low normal B12 (307) with methylmalonic acid is still pending.    Cervical spine MRI without contrast from 3/13/2023 demonstrated mild degenerative changes in cervical spine, most pronounced at C5-6 with there is mild bilateral neuroforaminal stenosis and mild spinal canal narrowing.  Images were personally reviewed and independently interpreted.  EXAM:                                                    Physical Exam:   Vitals: /86 (BP Location: Left arm, Patient Position: Sitting, Cuff Size: Adult Regular)   Pulse 99   SpO2 100%     General: pt is in NAD, cooperative.  Skin: normal turgor, moist mucous membranes, no lesions/rashes noticed.  HEENT: ATNC, white sclera, normal conjunctiva.  Respiratory: Symmetric lung excursion, no accessory respiratory muscle use.  Abdomen: Non distended.  Neurological: awake, cooperative, follows commands, no exam changes compared to the  previous visit.  ASSESSMENT AND PLAN:                                                    Assessment: 47 year old male patient presents for follow-up of limb paresthesias and left neck/shoulder pain radiating to the left arm after completion of recommended work-up.  We reviewed the results, as detailed above.  I would suggest consideration of starting B12 replacement at low-dose to see if it helps his generalized paresthesias.  At the same time, cervical spine MRI might suggest presence of cervical radiculopathy.  Focal entrapment neuropathies are also in differential.  For these reasons, I placed an order for EMG.  This test would also be helpful to screen for peripheral polyneuropathy.    Diagnoses:    ICD-10-CM    1. Cervical pain  M54.2       2. Arm paresthesia, left  R20.2       3. Paresthesia of upper and lower extremities of both sides  R20.2         Plan: At today's visit we thoroughly discussed current symptoms, evaluation results, additionally needed testing, available treatment options, and the plan.    We decided to try low-dose of vitamin B12 replacement at 500 mcg/day and obtain EMG.  I also placed an order for physical therapy for his neck issues.    Next follow-up appointment is in the next 6-8 weeks or earlier if needed.    Total Time: 21 minutes spent on the date of the encounter doing chart review, history and exam, documentation and further activities per the note.    Dimitri Ward MD  [facility] Neurology  (Chart documentation was completed in part with Dragon voice-recognition software. Even though reviewed, some grammatical, spelling, and word errors may remain.)      Gosai Millan is a 47 year old male who presents for:  Chief Complaint   Patient presents with     Follow Up     Review Imaging         Initial Vitals:  /86 (BP Location: Left arm, Patient Position: Sitting, Cuff Size: Adult Regular)   Pulse 99   SpO2 100%  Estimated body mass index is 23.19 kg/m  as  calculated from the following:    Height as of 3/9/23: 1.829 m (6').    Weight as of 3/9/23: 77.6 kg (171 lb).. There is no height or weight on file to calculate BSA. BP completed using cuff size: dwaine Meade        Again, thank you for allowing me to participate in the care of your patient.        Sincerely,        Dimitri Ward MD

## 2023-03-15 NOTE — PROGRESS NOTES
ESTABLISHED PATIENT NEUROLOGY NOTE    DATE OF VISIT: 3/15/2023  CLINIC LOCATION: [facility]  MRN: [id number]  PATIENT NAME: Gosia Millan  YOB: 1975    REASON FOR VISIT:   Chief Complaint   Patient presents with     Follow Up     Review Imaging      SUBJECTIVE:                                                      HISTORY OF PRESENT ILLNESS: Patient is here to follow up regarding limb paresthesias and left neck/shoulder pain radiating to the left arm. Please refer to my initial/other prior notes for further information.    Since the last visit, the patient reports some improvement in his neck discomfort, but no other symptom changes.  He denies interval development of new focal neurological symptoms.    Laboratory evaluation demonstrated low normal B12 (307) with methylmalonic acid is still pending.    Cervical spine MRI without contrast from 3/13/2023 demonstrated mild degenerative changes in cervical spine, most pronounced at C5-6 with there is mild bilateral neuroforaminal stenosis and mild spinal canal narrowing.  Images were personally reviewed and independently interpreted.  EXAM:                                                    Physical Exam:   Vitals: /86 (BP Location: Left arm, Patient Position: Sitting, Cuff Size: Adult Regular)   Pulse 99   SpO2 100%     General: pt is in NAD, cooperative.  Skin: normal turgor, moist mucous membranes, no lesions/rashes noticed.  HEENT: ATNC, white sclera, normal conjunctiva.  Respiratory: Symmetric lung excursion, no accessory respiratory muscle use.  Abdomen: Non distended.  Neurological: awake, cooperative, follows commands, no exam changes compared to the previous visit.  ASSESSMENT AND PLAN:                                                    Assessment: 47 year old male patient presents for follow-up of limb paresthesias and left neck/shoulder pain radiating to the left arm after completion of recommended work-up.  We  reviewed the results, as detailed above.  I would suggest consideration of starting B12 replacement at low-dose to see if it helps his generalized paresthesias.  At the same time, cervical spine MRI might suggest presence of cervical radiculopathy.  Focal entrapment neuropathies are also in differential.  For these reasons, I placed an order for EMG.  This test would also be helpful to screen for peripheral polyneuropathy.    Diagnoses:    ICD-10-CM    1. Cervical pain  M54.2       2. Arm paresthesia, left  R20.2       3. Paresthesia of upper and lower extremities of both sides  R20.2         Plan: At today's visit we thoroughly discussed current symptoms, evaluation results, additionally needed testing, available treatment options, and the plan.    We decided to try low-dose of vitamin B12 replacement at 500 mcg/day and obtain EMG.  I also placed an order for physical therapy for his neck issues.    Next follow-up appointment is in the next 6-8 weeks or earlier if needed.    Total Time: 21 minutes spent on the date of the encounter doing chart review, history and exam, documentation and further activities per the note.    Dimitri Ward MD  [facility] Neurology  (Chart documentation was completed in part with Dragon voice-recognition software. Even though reviewed, some grammatical, spelling, and word errors may remain.)

## 2023-03-15 NOTE — PROGRESS NOTES
Gosia Millan is a 47 year old male who presents for:  Chief Complaint   Patient presents with     Follow Up     Review Imaging         Initial Vitals:  /86 (BP Location: Left arm, Patient Position: Sitting, Cuff Size: Adult Regular)   Pulse 99   SpO2 100%  Estimated body mass index is 23.19 kg/m  as calculated from the following:    Height as of 3/9/23: 1.829 m (6').    Weight as of 3/9/23: 77.6 kg (171 lb).. There is no height or weight on file to calculate BSA. BP completed using cuff size: regular        Juliette Meade

## 2023-03-15 NOTE — PATIENT INSTRUCTIONS
AFTER VISIT SUMMARY (AVS):    At today's visit we thoroughly discussed current symptoms, evaluation results, additionally needed testing, available treatment options, and the plan.    We decided to try low-dose of vitamin B12 replacement at 500 mcg/day and obtain EMG.  I also placed an order for physical therapy for your neck issues.    Next follow-up appointment is in the next 6-8 weeks or earlier if needed.    Please do not hesitate to call me with any questions or concerns.    Thanks.

## 2023-03-16 LAB — METHYLMALONATE SERPL-SCNC: 0.15 UMOL/L (ref 0–0.4)

## 2023-03-21 ENCOUNTER — HOSPITAL ENCOUNTER (OUTPATIENT)
Dept: PHYSICAL THERAPY | Facility: REHABILITATION | Age: 48
Discharge: HOME | End: 2023-03-21
Attending: PSYCHIATRY & NEUROLOGY
Payer: COMMERCIAL

## 2023-03-21 DIAGNOSIS — M54.2 CERVICAL PAIN: ICD-10-CM

## 2023-03-21 DIAGNOSIS — M54.12 CERVICAL RADICULOPATHY: Primary | ICD-10-CM

## 2023-03-21 PROCEDURE — 97161 PT EVAL LOW COMPLEX 20 MIN: CPT | Mod: GP | Performed by: PHYSICAL THERAPIST

## 2023-03-21 PROCEDURE — 97110 THERAPEUTIC EXERCISES: CPT | Mod: GP | Performed by: PHYSICAL THERAPIST

## 2023-03-21 PROCEDURE — 97140 MANUAL THERAPY 1/> REGIONS: CPT | Mod: GP | Performed by: PHYSICAL THERAPIST

## 2023-03-21 NOTE — PROGRESS NOTES
03/21/23 0700   General Information   Type of Visit Initial OP Ortho PT Evaluation   Start of Care Date 03/21/23   Referring Physician Dimitri Ward MD   Patient/Family Goals Statement get better   Orders Evaluate and Treat   Date of Order 03/15/23   Certification Required? No   Medical Diagnosis Cervical pain   Surgical/Medical history reviewed Yes  Past Medical History:   Diagnosis Date    HTN (hypertension)     Multiple joint pain     Pre-diabetes     Sinus tachycardia      Patient Active Problem List   Diagnosis    Pre-diabetes    Constipation, unspecified constipation type    Ehrlichiosis     No past surgical history on file.     Precautions/Limitations no known precautions/limitations   Presentation and Etiology   Pertinent history of current problem (include personal factors and/or comorbidities that impact the POC) Patient presents to therapy today with complaints of neck pain, upper back and lower back pain, hip pain. Has intermittent pain in (L) shoulder/scapula and (L) arm. Having other symptoms in all joints. Neck pain is most recent, noticed crepitus. Pain level 3/10, range 2/10 to 6/10. Date of onset/duration of symptoms is 12/2022 (neck pain). Other symptoms present for about 2 years. Onset was insidious. Symptoms are getting better - pain improved, but continued cracking symptoms. Patient denies a history of similar symptoms. Patient describes their previous level of function as not limited. Not sure what makes symptoms worse, seems to come and go. Symptoms improve with hot bath, meds. Previous treatment includes meds. Functional limitations: repetitive upper body/arm movements, turning head. Pain comes/goes and doesn't seem related to activity or position.   Impairments A. Pain;R. Other   Impairment comment crepitus   Functional Limitations perform activities of daily living   How/Where did it occur From insidious onset   Onset date of current episode/exacerbation   (12/2022,  "ref date 3/15/23)   Chronicity New   Current / Previous Interventions   Diagnostic Tests: MRI   MRI Results Results   MRI results 3/13/23: see Epic. \"Mild degenerative changes in the cervical spine most  apparent at C5-6, where there is mild bilateral neural foraminal  stenosis and mild spinal canal narrowing\"   Prior Level of Function   Functional Level Prior Comment not limited   Current Level of Function   Patient role/employment history A. Employed   Employment Comments medical lab scientist   Fall Risk Screen   Fall screen completed by PT   Have you fallen 2 or more times in the past year? No   Have you fallen and had an injury in the past year? No   Is patient a fall risk? No   Abuse Screen (yes response referral indicated)   Feels Unsafe at Home or Work/School no   Feels Threatened by Someone no   Does Anyone Try to Keep You From Having Contact with Others or Doing Things Outside Your Home? no   Patient needs abuse support services and resources No   System Outcome Measures   Outcome Measures   (NDI 12/100)   Cervical Spine   Observation (R) handed   Posture (R) cerv SB, (R) shoulder depression, R>L lower scapular winging, (B) scapular protraction   Cervical Flexion ROM WNL mild crepitus   Cervical Extension ROM WNL   Cervical Right Side Bending ROM 34 deg   Cervical Left Side Bending ROM 31 deg   Cervical Right Rotation ROM WNL   Cervical Left Rotation ROM WNL crepitus on (R)   Segmental Mobility-Cervical moderately limited in (R) cervical side glide, mod limited in PA mobility   Palpation No tenderness of UT, cranial base, post and lat cerv spine. Fascial restrictions noted (L) cervical and upper thorax region.   Neurological Testing Comments Unable to elicit (L) UE reflexed due to guarding   UE Neural Tension   ((L) UE neural tension tests (-))   Planned Therapy Interventions   Planned Therapy Interventions manual therapy;neuromuscular re-education;ROM;strengthening;stretching   Clinical Impression "   Criteria for Skilled Therapeutic Interventions Met yes, treatment indicated   PT Diagnosis midline cervical pain with (L) radiculopathy,   Influenced by the following impairments posture deficits and asymmetries, crepitus with CROM, fascial restrictions of (L) cervical and upper thoracic region, decreased segmental mobiity of cervical spine   Functional limitations due to impairments repetitive use of upper body. Pt reports minimal functional limitations. Symptoms are intermittent.   Clinical Presentation Stable/Uncomplicated   Clinical Decision Making (Complexity) Low complexity   Therapy Frequency 1 time/week   Predicted Duration of Therapy Intervention (days/wks) up to 12 visits PRN, time frame dependent on appt access.   Risk & Benefits of therapy have been explained Yes   Patient, Family & other staff in agreement with plan of care Yes   Clinical Impression Comments Patient presents with midline neck pain, crepitus, (L) shoulder/scapula and (L) arm pain, onset 12/2022. Hx of pain in other joints, upper back, lower back. Minimal functional limitations with intermittent symptoms. Findings include posture deficits and asymmetries, crepitus with CROM, fascial restrictions of (L) cervical and upper thoracic region, decreased segmental mobiity of cervical spine.   Ortho Goal 1   Goal Identifier HEP   Goal Description Patient will be independent with home exercise program and self management of symptoms in 12 weeks.   Ortho Goal 2   Goal Identifier neck pain   Goal Description patient will report 50% reduction in neck pain in 8 weeks.   Ortho Goal 3   Goal Identifier neck crepitus   Goal Description Patient will report 50% reduction in cervical spine crepitus in 12 weeks.   Total Evaluation Time   PT Eval, Low Complexity Minutes (82394) 85

## 2023-03-21 NOTE — PROGRESS NOTES
"   03/21/23 0700   Signing Clinician's Name / Credentials   Signing clinician's name / credentials Cele Felix PT   Session Number   Session Number 1   Progress Note/Recertification   Progress Note Due Date 06/19/23   Ortho Goal 1   Goal Identifier HEP   Goal Description Patient will be independent with home exercise program and self management of symptoms in 12 weeks.   Ortho Goal 2   Goal Identifier neck pain   Goal Description patient will report 50% reduction in neck pain in 8 weeks.   Ortho Goal 3   Goal Identifier neck crepitus   Goal Description Patient will report 50% reduction in cervical spine crepitus in 12 weeks.   Subjective Report   Subjective Report see eval   Objective Measure 1   Details see eval   Therapeutic Procedure/exercise   Therapeutic Procedures: strength, endurance, ROM, flexibillity minutes (92639) 10   Skilled Intervention ther ex   Patient Response good   Treatment Detail posture education, seated cerv retraction 10 x 5\" , cerv rot ROM 5 x 10\" , cerv SB/UT stretch 5 x 10\"   Manual Therapy   Manual Therapy: Mobilization, MFR, MLD, friction massage minutes (67687) 15   Skilled Intervention MFR, counterstrain   Patient Response good   Treatment Detail (L) subclavius, (L) clavipectoral fascia, dural tube pull, cranial base release, (L) LFC3-5-MS,   Education   Learner Patient   Readiness Eager   Method Booklet/handout;Explanation;Demonstration   Response Demonstrates Understanding   Communication with other professionals   Communication with other professionals rf provider: Dimitri Ward MD   Plan   Home program posture, chin tucks, CROM/rot and SB,   Plan for next session Continue with POC including posture education, manual therapy and home program progression for ROM and postural strength   Comments   Comments initial assessment: Patient presents with midline neck pain, crepitus, (L) shoulder/scapula and (L) arm pain, onset 12/2022. Hx of pain in other joints, upper " back, lower back. Minimal functional limitations with intermittent symptoms. Findings include posture deficits and asymmetries, crepitus with CROM, fascial restrictions of (L) cervical and upper thoracic region, decreased segmental mobiity of cervical spine.   Total Session Time   Timed Code Treatment Minutes 25   Total Treatment Time (sum of timed and untimed services) 65   Medicare Claim Information   Medical Diagnosis Cervical pain   PT Diagnosis midline cervical pain with (L) radiculopathy,   Start of Care Date 03/21/23   Onset date of current episode/exacerbation   (12/2022, ref date 3/15/23)

## 2023-03-23 DIAGNOSIS — E55.9 VITAMIN D DEFICIENCY: Primary | ICD-10-CM

## 2023-03-24 NOTE — RESULT ENCOUNTER NOTE
Hiruy    Your glucose and/or A1c (diabetic number) is elevated. This is a concerning for Prediabetes.     Recommend dietary changes by reducing your carbohydrates (breads, rice, pasta, potatoes, sugary drinks) and starting an exercise routine of approximately 30 minutes 3 times a week.    We will recheck your glucose level in 6 months.

## 2023-03-27 ENCOUNTER — ANESTHESIA EVENT (OUTPATIENT)
Dept: SURGERY | Facility: AMBULATORY SURGERY CENTER | Age: 48
End: 2023-03-27
Payer: COMMERCIAL

## 2023-03-28 ENCOUNTER — ANESTHESIA (OUTPATIENT)
Dept: SURGERY | Facility: AMBULATORY SURGERY CENTER | Age: 48
End: 2023-03-28
Payer: COMMERCIAL

## 2023-03-28 ENCOUNTER — HOSPITAL ENCOUNTER (OUTPATIENT)
Facility: AMBULATORY SURGERY CENTER | Age: 48
Discharge: HOME | End: 2023-03-28
Attending: SURGERY
Payer: COMMERCIAL

## 2023-03-28 VITALS
RESPIRATION RATE: 16 BRPM | SYSTOLIC BLOOD PRESSURE: 135 MMHG | HEART RATE: 58 BPM | BODY MASS INDEX: 23.16 KG/M2 | HEIGHT: 72 IN | WEIGHT: 171 LBS | TEMPERATURE: 97.1 F | OXYGEN SATURATION: 99 % | DIASTOLIC BLOOD PRESSURE: 81 MMHG

## 2023-03-28 DIAGNOSIS — Z12.11 SCREEN FOR COLON CANCER: ICD-10-CM

## 2023-03-28 LAB — COLONOSCOPY: NORMAL

## 2023-03-28 PROCEDURE — 45378 DIAGNOSTIC COLONOSCOPY: CPT | Mod: PT | Performed by: SURGERY

## 2023-03-28 RX ORDER — OXYCODONE HYDROCHLORIDE 10 MG/1
10 TABLET ORAL
Status: DISCONTINUED | OUTPATIENT
Start: 2023-03-28 | End: 2023-03-29 | Stop reason: HOSPADM

## 2023-03-28 RX ORDER — PROPOFOL 10 MG/ML
INJECTION, EMULSION INTRAVENOUS CONTINUOUS PRN
Status: DISCONTINUED | OUTPATIENT
Start: 2023-03-28 | End: 2023-03-28

## 2023-03-28 RX ORDER — ONDANSETRON 2 MG/ML
4 INJECTION INTRAMUSCULAR; INTRAVENOUS
Status: DISCONTINUED | OUTPATIENT
Start: 2023-03-28 | End: 2023-03-29 | Stop reason: HOSPADM

## 2023-03-28 RX ORDER — LIDOCAINE HYDROCHLORIDE 10 MG/ML
INJECTION, SOLUTION INFILTRATION; PERINEURAL PRN
Status: DISCONTINUED | OUTPATIENT
Start: 2023-03-28 | End: 2023-03-28

## 2023-03-28 RX ORDER — PROPOFOL 10 MG/ML
INJECTION, EMULSION INTRAVENOUS PRN
Status: DISCONTINUED | OUTPATIENT
Start: 2023-03-28 | End: 2023-03-28

## 2023-03-28 RX ORDER — OXYCODONE HYDROCHLORIDE 5 MG/1
5 TABLET ORAL
Status: DISCONTINUED | OUTPATIENT
Start: 2023-03-28 | End: 2023-03-29 | Stop reason: HOSPADM

## 2023-03-28 RX ORDER — LIDOCAINE 40 MG/G
CREAM TOPICAL
Status: DISCONTINUED | OUTPATIENT
Start: 2023-03-28 | End: 2023-03-29 | Stop reason: HOSPADM

## 2023-03-28 RX ORDER — SODIUM CHLORIDE, SODIUM LACTATE, POTASSIUM CHLORIDE, CALCIUM CHLORIDE 600; 310; 30; 20 MG/100ML; MG/100ML; MG/100ML; MG/100ML
INJECTION, SOLUTION INTRAVENOUS CONTINUOUS
Status: DISCONTINUED | OUTPATIENT
Start: 2023-03-28 | End: 2023-03-29 | Stop reason: HOSPADM

## 2023-03-28 RX ORDER — NAPROXEN 500 MG/1
500 TABLET ORAL 2 TIMES DAILY WITH MEALS
COMMUNITY
End: 2024-08-29

## 2023-03-28 RX ORDER — SODIUM CHLORIDE, SODIUM LACTATE, POTASSIUM CHLORIDE, CALCIUM CHLORIDE 600; 310; 30; 20 MG/100ML; MG/100ML; MG/100ML; MG/100ML
INJECTION, SOLUTION INTRAVENOUS CONTINUOUS PRN
Status: DISCONTINUED | OUTPATIENT
Start: 2023-03-28 | End: 2023-03-28

## 2023-03-28 RX ADMIN — PROPOFOL 50 MG: 10 INJECTION, EMULSION INTRAVENOUS at 07:14

## 2023-03-28 RX ADMIN — PROPOFOL 50 MG: 10 INJECTION, EMULSION INTRAVENOUS at 07:22

## 2023-03-28 RX ADMIN — PROPOFOL 100 MCG/KG/MIN: 10 INJECTION, EMULSION INTRAVENOUS at 07:15

## 2023-03-28 RX ADMIN — PROPOFOL 50 MG: 10 INJECTION, EMULSION INTRAVENOUS at 07:16

## 2023-03-28 RX ADMIN — SODIUM CHLORIDE, SODIUM LACTATE, POTASSIUM CHLORIDE, CALCIUM CHLORIDE: 600; 310; 30; 20 INJECTION, SOLUTION INTRAVENOUS at 06:35

## 2023-03-28 RX ADMIN — SODIUM CHLORIDE, SODIUM LACTATE, POTASSIUM CHLORIDE, CALCIUM CHLORIDE: 600; 310; 30; 20 INJECTION, SOLUTION INTRAVENOUS at 07:11

## 2023-03-28 RX ADMIN — PROPOFOL 50 MG: 10 INJECTION, EMULSION INTRAVENOUS at 07:30

## 2023-03-28 RX ADMIN — LIDOCAINE HYDROCHLORIDE 60 MG: 10 INJECTION, SOLUTION INFILTRATION; PERINEURAL at 07:14

## 2023-03-28 NOTE — DISCHARGE INSTRUCTIONS
If you have any questions or concerns regarding your procedure, please contact Dr. Acuña, his office number is [phone].     Discharge Instructions:    Take you medications as directed and follow up with you primary provider as scheduled.   You should expect to pass air from your rectum after the procedure.   Follow these care guidelines during your recovery for the next 24 hours.   If you have any questions or concerns please contact the provider that performed your procedure.     You were given medicine for sedation:  You have been given medicines during your procedure that might make you sleepy and weak. To prevent problems:    *Rest for the rest of the day after you are home. You should be back to your normal activity tomorrow.  *For the next 24 hours:   -Do not drink alcoholic beverages.   -Do not make any important decisions or sign any legal forms.   -Do not work around machinery or power equipment.     The medicines used for sedation may make you feel nauseated.   *Start with clear liquids, such as tea, jell-o, broth and ginger ale. As you feel better you may add soft foods such as pudding and ice cream.   *When you no longer feel nauseated, you may try your normal diet.     You should be back to eating your normal after 24 hours.     Call if you have any of these problems:  *Fever of 101 degree F or 38 degrees C  *Bleeding from the rectum  *Black stool or blood in your bowel movements  *Nausea with vomiting that does not ease after a few hours.  *Abdominal pain or bloating  *Fainting

## 2023-03-28 NOTE — ANESTHESIA POSTPROCEDURE EVALUATION
Patient: Gosia Millan    Procedure: Procedure(s):  COLONOSCOPY WITH BIOPSY       Anesthesia Type:  MAC    Note:  Disposition: Outpatient   Postop Pain Control: Uneventful            Sign Out: Well controlled pain   PONV: No   Neuro/Psych: Uneventful            Sign Out: Acceptable/Baseline neuro status   Airway/Respiratory: Uneventful            Sign Out: Acceptable/Baseline resp. status   CV/Hemodynamics: Uneventful            Sign Out: Acceptable CV status; No obvious hypovolemia; No obvious fluid overload   Other NRE:    DID A NON-ROUTINE EVENT OCCUR?            Last vitals:  Vitals Value Taken Time   /88 03/28/23 0816   Temp 97.1  F (36.2  C) 03/28/23 0747   Pulse 56 03/28/23 0827   Resp 16 03/28/23 0815   SpO2 100 % 03/28/23 0827   Vitals shown include unvalidated device data.    Electronically Signed By: Adiel Chapin MD  March 28, 2023  10:52 AM

## 2023-03-28 NOTE — ANESTHESIA CARE TRANSFER NOTE
Patient: Gosia Millan    Procedure: Procedure(s):  COLONOSCOPY WITH BIOPSY       Diagnosis: Screen for colon cancer [Z12.11]  Diagnosis Additional Information: No value filed.    Anesthesia Type:   MAC     Note:    Oropharynx: spontaneously breathing  Level of Consciousness: drowsy  Oxygen Supplementation: face mask  Level of Supplemental Oxygen (L/min / FiO2): 6  Independent Airway: airway patency satisfactory and stable  Dentition: dentition unchanged  Vital Signs Stable: post-procedure vital signs reviewed and stable  Report to RN Given: handoff report given  Patient transferred to: Phase II  Comments: Pt. Pink and breathing spontaneously.  Vitals stable.  Report given to oncoming nurse.  Transfer of care occurred.   Handoff Report: Identifed the Patient, Identified the Reponsible Provider, Reviewed the pertinent medical history, Discussed the surgical course, Reviewed Intra-OP anesthesia mangement and issues during anesthesia, Set expectations for post-procedure period and Allowed opportunity for questions and acknowledgement of understanding      Vitals:  Vitals Value Taken Time   /77 03/28/23 0747   Temp 97.1  F (36.2  C) 03/28/23 0747   Pulse 69 03/28/23 0747   Resp 14 03/28/23 0747   SpO2 99 % 03/28/23 0747       Electronically Signed By: CAROLE Flores CRNA  March 28, 2023  7:49 AM

## 2023-03-28 NOTE — ANESTHESIA PREPROCEDURE EVALUATION
Anesthesia Pre-Procedure Evaluation    Patient: Gosia Millan   MRN: [id number] : 1975        Procedure : Procedure(s):  COLONOSCOPY          Past Medical History:   Diagnosis Date     Arrhythmia      Multiple joint pain      Pre-diabetes      Sinus tachycardia       History reviewed. No pertinent surgical history.   No Known Allergies   Social History     Tobacco Use     Smoking status: Never     Smokeless tobacco: Never   Substance Use Topics     Alcohol use: Yes     Comment: once every month       Wt Readings from Last 1 Encounters:   23 77.6 kg (171 lb)        Anesthesia Evaluation            ROS/MED HX  ENT/Pulmonary:  - neg pulmonary ROS     Neurologic:  - neg neurologic ROS     Cardiovascular:  - neg cardiovascular ROS     METS/Exercise Tolerance: >4 METS    Hematologic:  - neg hematologic  ROS     Musculoskeletal:  - neg musculoskeletal ROS     GI/Hepatic:  - neg GI/hepatic ROS     Renal/Genitourinary:  - neg Renal ROS     Endo: Comment: Pre diabetes - neg endo ROS     Psychiatric/Substance Use:  - neg psychiatric ROS     Infectious Disease:  - neg infectious disease ROS     Malignancy:  - neg malignancy ROS     Other:  - neg other ROS          Physical Exam    Airway  airway exam normal      Mallampati: II       Respiratory Devices and Support         Dental           Cardiovascular   cardiovascular exam normal       Rhythm and rate: regular and normal     Pulmonary   pulmonary exam normal        breath sounds clear to auscultation           OUTSIDE LABS:  CBC:   Lab Results   Component Value Date    WBC 6.5 10/24/2022    WBC 6.6 2020    HGB 15.9 10/24/2022    HGB 16.6 2020    HCT 47.6 10/24/2022    HCT 48.6 2020     10/24/2022     2020     BMP:   Lab Results   Component Value Date     2023     2021    POTASSIUM 4.3 2023    POTASSIUM 4.1 2021    CHLORIDE 104 2023    CHLORIDE 105 2021    CO2 23 2023     CO2 24 05/14/2021    BUN 12.6 02/07/2023    BUN 8 05/14/2021    CR 0.76 02/07/2023    CR 0.77 05/14/2021     (H) 02/07/2023     (H) 05/14/2021     COAGS: No results found for: PTT, INR, FIBR  POC: No results found for: BGM, HCG, HCGS  HEPATIC:   Lab Results   Component Value Date    ALBUMIN 4.6 02/07/2023    PROTTOTAL 7.2 02/07/2023    ALT 11 02/07/2023    AST 22 02/07/2023    ALKPHOS 71 02/07/2023    BILITOTAL 0.6 02/07/2023     OTHER:   Lab Results   Component Value Date    A1C 5.9 (H) 10/24/2022    DRE 9.4 02/07/2023    TSH 0.99 02/07/2023    SED 7 10/24/2022       Anesthesia Plan    ASA Status:  1      Anesthesia Type: MAC.   Induction: Intravenous, Propofol.   Maintenance: TIVA.        Consents    Anesthesia Plan(s) and associated risks, benefits, and realistic alternatives discussed. Questions answered and patient/representative(s) expressed understanding.    - Discussed:     - Discussed with:  Patient      - Extended Intubation/Ventilatory Support Discussed: No.      - Patient is DNR/DNI Status: No    Use of blood products discussed: No .     Postoperative Care    Pain management: IV analgesics.   PONV prophylaxis: Ondansetron (or other 5HT-3), Dexamethasone or Solumedrol     Comments:    Other Comments: Propofol infusion  Versed and fentanyl prn  Antiemetics zofan 4/decadron 4mg pre diabetic)            Adiel Chapin MD

## 2023-03-28 NOTE — H&P
PRE COLONOSCOPY EVALUATION   H&P       ASSESSMENT     Gosia Millan is a 47 year old male who is in today for a Screening Colonoscopy.  The patient has not had a stool screening test.         PLAN      Screening Colonoscopy         HPI     Gosia Millan is a 47 year old male who presents for a colonoscopy.  They do have a family history of colon cancer: Father  They do not have a history of polyps  They have not been loosing weight  They have not noted any change in bowel habits   They have not had blood in their stool.         PAST MEDICAL HISTORY SURGICAL HISTORY     Past Medical History:   Diagnosis Date     Arrhythmia      Multiple joint pain      Pre-diabetes      Sinus tachycardia     History reviewed. No pertinent surgical history.       CURRENT MEDICATIONS ALLERGIES     Current Outpatient Rx   Medication Sig Dispense Refill     naproxen (NAPROSYN) 500 MG tablet Take 500 mg by mouth 2 times daily (with meals)       bisacodyl (DULCOLAX) 5 MG EC tablet 2 days prior to procedure, take 2 tablets at 4 pm. 1 day prior to procedure, take 2 tablets at 4 pm. For additional instructions refer to your colonoscopy prep instructions. (Patient not taking: Reported on 2/7/2023) 4 tablet 0     polyethylene glycol (GOLYTELY) 236 g suspension 2 days prior at 5pm, mix and drink half of a jug of Golytely. Drink an 8 oz. glass of Golytely every 15 minutes until half of the jug is gone. Place remainder of Golytely in the refrigerator. 1 day prior at 5 pm, drink the 2nd half of a jug of Golytely bowel prep. 6 hours before your check-in time, drink an 8 oz. glass of Golytely every 15 minutes until half of the 2nd jug of Golytely is gone. Discard remainder of second jug. (Patient not taking: Reported on 2/7/2023) 8000 mL 0    No Known Allergies       FAMILY HISTORY     Family History   Problem Relation Age of Onset     Diabetes Mother      Osteopenia Mother      Diabetes Father      Colon Cancer Father      Diabetes Brother       Sarcoidosis Brother      Arthritis Brother      No Known Problems Brother          SOCIAL HISTORY     Social History     Socioeconomic History     Marital status:      Spouse name: None     Number of children: None     Years of education: None     Highest education level: None   Tobacco Use     Smoking status: Never     Smokeless tobacco: Never   Vaping Use     Vaping Use: Never used   Substance and Sexual Activity     Alcohol use: Yes     Comment: once every month      Drug use: No     Sexual activity: Yes     Partners: Female         REVIEW OF SYSTEMS       10 point review of systems are unremarkable except for the symptoms described in the HPI    PHYSICAL EXAM     VITAL SIGNS: BP (!) 141/88   Pulse 70   Temp 97.6  F (36.4  C) (Temporal)   Resp 16   Ht 1.829 m (6')   Wt 77.6 kg (171 lb)   SpO2 100%   BMI 23.19 kg/m     General : Alert, cooperative, appears stated age   Head: Normocephalic, without obvious abnormality,   HEENT:  conjunctiva/corneas clear, EOM's intact, no scleral icterus   Lungs: Clear to auscultation bilaterally, respirations unlabored  Heart: Regular rate and rhythm, S1, S2 normal, no murmur, rub or gallop   Abdomen: Soft, non-tender, no guarding, + bowel sounds active,   Extremities : No obvious swelling,  Neurologic:  moves all extremities to command, no focal findings    Airway: Class 2  ASA:   2      [facility]  [phone]

## 2023-03-28 NOTE — OP NOTE
COLONOSCOPY REPORT      Pre-op Dx:              Screening colonoscopy      Procedure:             Colonoscopy      Indications:            Colon Cancer Screening      Findings:                Normal colonoscopy    Procedure:              The patient is brought to the endoscopy suite placed in a lateral position and the patient is given conscious sedation anesthesia.  The colonoscope was advanced atraumatically into the anus taken all way up and around to the cecum.  The ileocecal valve and the appendiceal orifice are clearly identified.  The scope was slowly drawn back no lesions seen in the cecum or the ascending colon no lesions seen in the transverse colon no lesions in the descending colon.  There was irritation in the mucosa of the sigmoid colon and I biopsied that with cold biopsy forceps.  The scope was drawn back into the rectum and retroflexed I do not see evidence for any significant hemorrhoidal disease.  The colonoscope was straightened and taken up to the splenic flexure areas aspirated from the left side of the colon as the scope was withdrawn.    Withdrawal Time:  11:00    EBL:                        None      Medications:         MAC; see anesthesia note for details          Complications:      None      Post-op Dx:            Normal Colonoscopy      Recommendation:   Given the positive family history of colon cancer I recommend your next Colonoscopy to be in 5 years; the year 2028      Eladio Acuña MD  3/28/2023 7:44 AM  [facility]  [phone]

## 2023-04-14 ENCOUNTER — HOSPITAL ENCOUNTER (OUTPATIENT)
Dept: PHYSICAL THERAPY | Facility: REHABILITATION | Age: 48
Discharge: HOME | End: 2023-04-14
Payer: COMMERCIAL

## 2023-04-14 DIAGNOSIS — M54.2 CERVICAL PAIN: Primary | ICD-10-CM

## 2023-04-14 DIAGNOSIS — M54.12 CERVICAL RADICULOPATHY: ICD-10-CM

## 2023-04-14 PROCEDURE — 97110 THERAPEUTIC EXERCISES: CPT | Mod: GP | Performed by: PHYSICAL THERAPIST

## 2023-04-14 PROCEDURE — 97140 MANUAL THERAPY 1/> REGIONS: CPT | Mod: GP | Performed by: PHYSICAL THERAPIST

## 2023-04-21 ENCOUNTER — HOSPITAL ENCOUNTER (OUTPATIENT)
Dept: PHYSICAL THERAPY | Facility: REHABILITATION | Age: 48
Discharge: HOME | End: 2023-04-21
Payer: COMMERCIAL

## 2023-04-21 DIAGNOSIS — M54.12 CERVICAL RADICULOPATHY: ICD-10-CM

## 2023-04-21 DIAGNOSIS — M54.2 CERVICAL PAIN: Primary | ICD-10-CM

## 2023-04-21 PROCEDURE — 97110 THERAPEUTIC EXERCISES: CPT | Mod: GP | Performed by: PHYSICAL THERAPIST

## 2023-04-21 PROCEDURE — 97140 MANUAL THERAPY 1/> REGIONS: CPT | Mod: GP | Performed by: PHYSICAL THERAPIST

## 2023-04-27 ENCOUNTER — OFFICE VISIT (OUTPATIENT)
Dept: RHEUMATOLOGY | Facility: CLINIC | Age: 48
End: 2023-04-27
Attending: FAMILY MEDICINE
Payer: COMMERCIAL

## 2023-04-27 ENCOUNTER — TRANSFERRED RECORDS (OUTPATIENT)
Dept: HEALTH INFORMATION MANAGEMENT | Facility: CLINIC | Age: 48
End: 2023-04-27

## 2023-04-27 VITALS
BODY MASS INDEX: 23.19 KG/M2 | SYSTOLIC BLOOD PRESSURE: 124 MMHG | WEIGHT: 171 LBS | HEART RATE: 84 BPM | DIASTOLIC BLOOD PRESSURE: 78 MMHG

## 2023-04-27 DIAGNOSIS — M25.50 POLYARTHRALGIA: Primary | ICD-10-CM

## 2023-04-27 DIAGNOSIS — M25.50 ARTHRALGIA, UNSPECIFIED JOINT: ICD-10-CM

## 2023-04-27 DIAGNOSIS — M47.812 CERVICAL SPONDYLOSIS: ICD-10-CM

## 2023-04-27 PROCEDURE — 99204 OFFICE O/P NEW MOD 45 MIN: CPT | Performed by: INTERNAL MEDICINE

## 2023-04-27 NOTE — PROGRESS NOTES
This document was created using a software with less than 100% fidelity, at times resulting in unintended, even erroneous syntax and grammar.  The reader is advised to keep this under consideration while reviewing, interpreting this note.      Rheumatology Consult Note      Gosia Millan     YOB: 1975 Age: 47 year old    Date of visit: 4/27/23    PCP: Kayden Bryan    Chief Complaint   Patient presents with:  Consult      Assessment and Plan     Gosia was seen today for consult.    Diagnoses and all orders for this visit:    Polyarthralgia    Arthralgia, unspecified joint  -     Adult Rheumatology  Referral    Cervical spondylosis           This patient with intermittent arthralgias, myalgias, in the background of cervical spondylosis, early OA changes in the hip left side, without any central serologic evidence of autoimmunity or acute phase response, has reassuringly no features to suggest a systemic inflammatory syndrome, connective tissue disease, or inflammatory joint disease, enthesopathy, dactylitis.  This is discussed and reassured.  We discussed the options of further observation, such as for him to come back for reexamination during the peak of his symptoms as currently he was in the phase where he would consider the pain to be mild.  Yet another option that we discussed was taking duloxetine major side effects were outlined he would like to defer that for now.  I have encouraged him to continue to stay active.  We will schedule an appointment for the next 3 to 4 months however should he have a flareup prior to that he will call in for an earlier appointment.    Return to clinic: 3 months      HPI   Gosia Millan is a 47 year old male  is seen today for evaluation of painful joints.  He noted that the symptoms have troubled him over the past 3 years.  These symptoms tend to affect multiple joint areas, there is a migratory phenomenon as well as intermittent.  He will have  "phases of pain for several weeks or even months and then these will then disappear.  The occurrence and rectification is totally random to him there are no precipitating factors that he can identify or alleviating ones.  He felt he feels feverish during those weeks and months when he has more pain however checking his temperature has revealed no fever.  Currently he thought that between the cycles of no pain and significant pain he is in the mild phase.  Typically he has pain in his shoulders hips knees neck shoulders especially more recently.  He hears creaking sounds in various joint areas most recently in his TMJ is.  That also concerns symptoms are significantly in part because of his brothers history who had developed \"arthritis\" and has difficulty ambulating.  His work-up has included an MRI of the C-spine where he has degenerative joint disease.  One of the x-rays of the hip joint in the past also suggest beginning of OA related changes.  He reports no swelling in the joints.  He does not have a personal or family history of psoriasis ulcerative colitis or Crohn's disease.  There is no family history of lupus rheumatoid arthritis ankylosing spondylitis.  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  He is a non-smoker.  Functionally he has remained fully intact.  Even during those weeks and months when he experiences pain he is able to continue to workout such as run 3 to 5 miles.  Recently as he began to notice more pain in his neck he has cut back on is working out.  He has noted morning stiffness of no more than 30 minutes.  He has not experienced rash, mouth ulcers, photosensitive, pleuritic symptoms.  His work-up has included negative CHARLIE and rheumatoid factor anti-CCP antibody sedimentation rate.  MRI of the C-spine alluded to earlier impression is appended..                                                                    IMPRESSION: Mild degenerative changes in the cervical spine most  apparent " at C5-6, where there is mild bilateral neural foraminal  stenosis and mild spinal canal narrowing.    Active Problem List     Patient Active Problem List   Diagnosis     Pre-diabetes     Constipation, unspecified constipation type     Ehrlichiosis     Past Medical History     Past Medical History:   Diagnosis Date     Arrhythmia      Multiple joint pain      Pre-diabetes      Sinus tachycardia      Past Surgical History     Past Surgical History:   Procedure Laterality Date     COLONOSCOPY N/A 3/28/2023    Procedure: COLONOSCOPY WITH BIOPSY;  Surgeon: Eladio Acuña MD;  Location: [place] [facility]     [facility]   No Known Allergies  Current Medication List     Current Outpatient Medications   Medication Sig     naproxen (NAPROSYN) 500 MG tablet Take 500 mg by mouth 2 times daily (with meals)     bisacodyl (DULCOLAX) 5 MG EC tablet 2 days prior to procedure, take 2 tablets at 4 pm. 1 day prior to procedure, take 2 tablets at 4 pm. For additional instructions refer to your colonoscopy prep instructions. (Patient not taking: Reported on 2/7/2023)     polyethylene glycol (GOLYTELY) 236 g suspension 2 days prior at 5pm, mix and drink half of a jug of Golytely. Drink an 8 oz. glass of Golytely every 15 minutes until half of the jug is gone. Place remainder of Golytely in the refrigerator. 1 day prior at 5 pm, drink the 2nd half of a jug of Golytely bowel prep. 6 hours before your check-in time, drink an 8 oz. glass of Golytely every 15 minutes until half of the 2nd jug of Golytely is gone. Discard remainder of second jug. (Patient not taking: Reported on 2/7/2023)     No current facility-administered medications for this visit.            Family History     Family History   Problem Relation Age of Onset     Diabetes Mother      Osteopenia Mother      Diabetes Father      Colon Cancer Father      Diabetes Brother      Sarcoidosis Brother      Arthritis Brother      No Known Problems Brother          Physical Exam      COMPREHENSIVE EXAMINATION:  Vitals:    04/27/23 1005   Weight: 77.6 kg (171 lb)     A well appearing alert oriented male. Vital data as noted above. His eyes examined for inflammation/scleromalacia. ENT examined for oral mucositis, moisture, thrush, nasal deformity, external ear redness, deformity. His neck is examined for suppleness and lymphadenopathy.  Cardiopulmonary examination without dyspnea at rest, use of accessory muscles of breathing, wheezing, edema, peripheral or central cyanosis.  Abdomen examined for softness, tenderness, obvious organomegaly.  Skin examined for heliotrope, malar area eruption, lupus pernio, periungual erythema, sclerodactyly, papules, erythema nodosum, purpura, nail pitting, onycholysis, and obvious psoriasis lesion. Neurological examination done for alertness, speech, facial symmetry,  tone and power in upper and lower extremities, and gait. The joint examination is performed for swelling, tenderness, warmth, erythema, and range of motion in the following joints: DIPs, PIPs, MCPs, wrists, first CMC's, elbows, shoulders, hips, knees, ankles, feet; spine for range of motion and paraspinal muscles for tenderness. The salient normal / abnormal findings are appended.  He does not have synovitis in any other palpable joints.  He does not have dactylitis.  There is no features suggestive of enthesitis.  He has minimal tenderness across trapezius.  He has no facial eruption, sclerodactyly periungual erythema.    Labs / Imaging (select studies)     Rheumatoid Factor   Date Value Ref Range Status   10/24/2022 9 <12 IU/mL Final   09/08/2020 8 <12 IU/mL Final      Hemoglobin   Date Value Ref Range Status   10/24/2022 15.9 13.3 - 17.7 g/dL Final   09/08/2020 16.6 13.3 - 17.7 g/dL Final     Urea Nitrogen   Date Value Ref Range Status   02/07/2023 12.6 6.0 - 20.0 mg/dL Final   05/14/2021 8 7 - 30 mg/dL Final   09/08/2020 9 7 - 30 mg/dL Final   05/08/2019 9 7 - 30 mg/dL Final     Sed Rate    Date Value Ref Range Status   09/08/2020 6 0 - 15 mm/h Final     Erythrocyte Sedimentation Rate   Date Value Ref Range Status   10/24/2022 7 0 - 15 mm/hr Final     AST   Date Value Ref Range Status   02/07/2023 22 10 - 50 U/L Final   09/08/2020 25 0 - 45 U/L Final   05/08/2019 19 0 - 45 U/L Final     Albumin   Date Value Ref Range Status   02/07/2023 4.6 3.5 - 5.2 g/dL Final   09/08/2020 4.2 3.4 - 5.0 g/dL Final   05/08/2019 4.6 3.4 - 5.0 g/dL Final     Alkaline Phosphatase   Date Value Ref Range Status   02/07/2023 71 40 - 129 U/L Final   09/08/2020 95 40 - 150 U/L Final   05/08/2019 100 40 - 150 U/L Final     ALT   Date Value Ref Range Status   02/07/2023 11 10 - 50 U/L Final   09/08/2020 41 0 - 70 U/L Final   05/08/2019 28 0 - 70 U/L Final     Rheumatoid Factor   Date Value Ref Range Status   10/24/2022 9 <12 IU/mL Final   09/08/2020 8 <12 IU/mL Final          Immunization History     Immunization History   Administered Date(s) Administered     COVID-19 Vaccine 12+ (Pfizer) 01/05/2021, 01/26/2021     COVID-19 Vaccine Bivalent Booster 12+ (Pfizer) 10/24/2022     DTaP, Unspecified 12/04/2012     Flu, Unspecified 11/04/2016     Hepatitis B, Adult 07/27/2012, 09/14/2012, 02/15/2013     Influenza (IIV3) PF 10/20/2015     Influenza Vaccine >6 months (Alfuria,Fluzone) 11/05/2014, 10/30/2019, 10/05/2021, 09/28/2022     Nasal Influenza Vaccine 2-49 (FluMist) 09/25/2015     TDAP (Adacel,Boostrix) 12/04/2012

## 2023-05-05 ENCOUNTER — HOSPITAL ENCOUNTER (OUTPATIENT)
Dept: PHYSICAL THERAPY | Facility: REHABILITATION | Age: 48
Discharge: HOME | End: 2023-05-05
Payer: COMMERCIAL

## 2023-05-05 DIAGNOSIS — M54.12 CERVICAL RADICULOPATHY: ICD-10-CM

## 2023-05-05 DIAGNOSIS — M54.2 CERVICAL PAIN: Primary | ICD-10-CM

## 2023-05-05 PROCEDURE — 97140 MANUAL THERAPY 1/> REGIONS: CPT | Mod: GP | Performed by: PHYSICAL THERAPIST

## 2023-05-05 PROCEDURE — 97110 THERAPEUTIC EXERCISES: CPT | Mod: GP | Performed by: PHYSICAL THERAPIST

## 2023-05-12 ENCOUNTER — HOSPITAL ENCOUNTER (OUTPATIENT)
Dept: PHYSICAL THERAPY | Facility: REHABILITATION | Age: 48
Discharge: HOME | End: 2023-05-12
Payer: COMMERCIAL

## 2023-05-12 DIAGNOSIS — M54.12 CERVICAL RADICULOPATHY: ICD-10-CM

## 2023-05-12 DIAGNOSIS — M54.2 CERVICAL PAIN: Primary | ICD-10-CM

## 2023-05-12 PROCEDURE — 97535 SELF CARE MNGMENT TRAINING: CPT | Mod: GP | Performed by: PHYSICAL THERAPIST

## 2023-05-12 PROCEDURE — 97110 THERAPEUTIC EXERCISES: CPT | Mod: GP | Performed by: PHYSICAL THERAPIST

## 2023-05-30 ENCOUNTER — MYC MEDICAL ADVICE (OUTPATIENT)
Dept: NEUROLOGY | Facility: CLINIC | Age: 48
End: 2023-05-30

## 2023-05-30 ENCOUNTER — OFFICE VISIT (OUTPATIENT)
Dept: NEUROLOGY | Facility: CLINIC | Age: 48
End: 2023-05-30
Attending: PSYCHIATRY & NEUROLOGY
Payer: COMMERCIAL

## 2023-05-30 DIAGNOSIS — R20.2 ARM PARESTHESIA, LEFT: ICD-10-CM

## 2023-05-30 DIAGNOSIS — R20.2 PARESTHESIA OF UPPER AND LOWER EXTREMITIES OF BOTH SIDES: ICD-10-CM

## 2023-05-30 PROCEDURE — 95913 NRV CNDJ TEST 13/> STUDIES: CPT | Performed by: PSYCHIATRY & NEUROLOGY

## 2023-05-30 PROCEDURE — 95886 MUSC TEST DONE W/N TEST COMP: CPT | Performed by: PSYCHIATRY & NEUROLOGY

## 2023-05-30 NOTE — PROGRESS NOTES
[facility]  Electrodiagnostic Laboratory                 Department of Neurology                                                                                                         Test Date:  2023    Patient: Gosia Millan : 1975 Physician: Jefferson Musa MD   Sex: Male AGE: 47 year Ref Phys: Dimitri Ward MD   ID#: [id number]   Technician: Noel Naranjo     History and Examination:    47-year-old man with complaints of intermittent paresthesias in his hands and feet bilaterally, as well as pain of the left neck and shoulder, radiating towards the forearm and the entire hand.  Query polyneuropathy, entrapment neuropathies of bilateral upper extremities, or left cervical radiculopathy.    Results:    Left fibular (EDB), tibial (AH), bilateral median (APB) and bilateral ulnar (ADM) motor nerve conduction studies were all normal.  Bilateral median to ulnar second lumbrical/palmar interossei comparison motor nerve conduction studies were normal.  Bilateral median, ulnar, radial, left sural, and left superficial fibular antidromic sensory nerve conduction studies were normal.  Bilateral median and ulnar orthodromic (Palmar) mixed nerve conduction studies were normal.  Needle EMG of the left FDI, triceps, deltoid, biceps, pronator teres, and C7 paraspinal muscles, were normal.    Interpretation:    Normal study.  No electrodiagnostic evidence of large fiber polyneuropathy, carpal tunnel syndrome or ulnar neuropathy on either side, or left cervical radiculopathy.    ___________________________  Jefferson Musa MD        Nerve Conduction Studies  Motor Sites      Latency Amplitude Neg. Amp Diff Segment Distance Velocity Neg. Dur Neg Area Diff Temperature Comment   Site (ms) Norm (mV) Norm %  cm m/s Norm ms %  C    Left Fibular (EDB) Motor   Ankle 5.0  < 6.0 5.8  > 2.5  Ankle-EDB 8   6.0  32.9 trtila 2 is acc per check, .98 mV   Bel Fib Head 14.2 -  6.7 - 15.5 Bel Fib Head-Ankle 39.7 43  > 38 5.8 18.9 34.7    Pop Fossa 15.8 - 6.8 - 1.49 Pop Fossa-Bel Fib Head 6.8 43  > 38 6.1 5.1 34.4    Left Median (APB) Motor   Wrist 3.7  < 4.4 10.3  > 5.0  Wrist-APB 8   6.8  33.7    Elbow 8.1 - 10.3  > 5.0 0 Elbow-Wrist - -  > 48 6.8 0.48 33.7    Right Median (APB) Motor   Wrist 3.9  < 4.4 9.1  > 5.0  Wrist-APB 8   6.7  32.3    Elbow 8.0 - 9.1  > 5.0 0 Elbow-Wrist 22.7 55  > 48 6.9 -1.46 32.3    Left Median/Ulnar (Lumb-Dors Int II) Motor        Median (Lumb I)   Wrist 3.4 - 1.18 -  Wrist-Lumb I 10   6.4  33.7         Ulnar (Dorsal Int (manus))   Wrist 3.2 - 3.0 -  Wrist-Dorsal Int (manus) 10   6.3  33.9    Right Median/Ulnar (Lumb-Dors Int II) Motor        Median (Lumb I)   Wrist 3.4 - 2.5 -  Wrist-Lumb I 10   6.4  32         Ulnar (Dorsal Int (manus))   Wrist 3.1 - 5.9 -  Wrist-Dorsal Int (manus) 10   6.2  32    Left Tibial (AHB) Motor   Ankle 3.8  < 6.5 5.5  > 5.0  Ankle-AHB 8   7.9  32.9    Knee 13.6 - 3.5 - -36.4 Knee-Ankle 39.6 40  > 38 8.9 -17.7 32.6    Left Ulnar (ADM) Motor   Wrist 3.5  < 3.5 10.6  > 5.0  Wrist-ADM 0.2   6.5  32.8    Bel Elbow 7.3 - 10.2 - -3.8 Bel Elbow-Wrist 20 53  > 48 6.8 -1.67 32.6    Abv Elbow 9.9 - 9.3 - -8.8 Abv Elbow-Bel Elbow 13 50  > 48 6.9 -7.9 32.5    Right Ulnar (ADM) Motor   Wrist 3.3  < 3.5 9.6  > 5.0  Wrist-ADM 8   5.8  33.2    Bel Elbow 7.0 - 8.8 - -8.3 Bel Elbow-Wrist 20.4 55  > 48 6.1 -4.2 32.9    Abv Elbow 9.3 - 8.3 - -5.7 Abv Elbow-Bel Elbow 11.8 51  > 48 6.7 0.33 32.6      Sensory Sites      Onset Lat Peak Lat Amp (O-P) Amp (P-P) Segment Distance Velocity Temperature Comment   Site ms ms  V Norm  V  cm m/s Norm  C    Left Median Sensory   Wrist-Dig II 2.6 3.4 53  > 10 91 Wrist-Dig II 14 54  > 48 32.7    Right Median Sensory   Wrist-Dig II 2.5 3.2 55  > 10 86 Wrist-Dig II 14 56  > 48 32    Left Median-Ulnar Palmar Sensory        Median   Palm-Wrist 1.43 1.88 75 - 95 Palm-Wrist 8 56 - 32.9         Ulnar   Palm-Wrist 1.45 2.1 21 -  25 Palm-Wrist 8 55 - 33.2    Right Median-Ulnar Palmar Sensory        Median   Palm-Wrist 1.60 2.1 38 - 37 Palm-Wrist 8 50 - 32.2         Ulnar   Palm-Wrist 1.48 2.1 24 - 32 Palm-Wrist 8 54 - 32.1    Left Radial Sensory   Forearm-Wrist 1.63 2.2 48  > 15 54 Forearm-Wrist 10 61 - 32.3    Right Radial Sensory   Forearm-Wrist 1.58 2.1 49  > 15 60 Forearm-Wrist 10 63 - 32    Left Superficial Fibular Sensory   14 cm-Ankle 2.9 3.8 13  > 3 20 14 cm-Ankle 12.5 43  > 38 32.2    Left Sural Sensory   Calf-Lat Mall 2.9 3.8 16  > 5 19 Calf-Lat Mall 14 48  > 38 32.1    Left Ulnar Sensory   Wrist-Dig V 2.5 3.4 34  > 8 57 Wrist-Dig V 12.5 50  > 48 32.8    Right Ulnar Sensory   Wrist-Dig V 2.4 3.0 28  > 8 49 Wrist-Dig V 12.5 52  > 48 32.8      Inter-Nerve Comparisons     Nerve 1 Value 1 Nerve 2 Value 2 Parameter Result Normal   Sensory Sites   R Median Palm-Wrist 2.1 ms R Ulnar Palm-Wrist 2.1 ms Peak Lat Diff 0.00 ms <0.40   L Median Palm-Wrist 1.9 ms L Ulnar Palm-Wrist 2.1 ms Peak Lat Diff 0.22 ms <0.40       Electromyography     Side Muscle Ins Act Fibs/PSW Fasc HF Amp Dur Poly Recrt Int Pat   Left Triceps Nml None Nml 0 Nml Nml 0 Nml Nml   Left Deltoid Nml None Nml 0 Nml Nml 0 Nml Nml   Left Biceps Nml None Nml 0 Nml Nml 0 Nml Nml   Left Pronator Teres Nml None Nml 0 Nml Nml 0 Nml Nml   Left FDI Nml None Nml 0 Nml Nml 0 Nml Nml   Left C7 Parasp Nml None Nml 0              NCS Waveforms:    Motor                           Sensory                                    Ultrasound Images:

## 2023-05-30 NOTE — LETTER
2023       RE: Gosia Millan  [address]  [place] [address]       Dear Colleague,    Thank you for referring your patient, Gosia Millan, to the [facility] EMG [facility] [place] at [facility]. Please see a copy of my visit note below.                        [facility]  Electrodiagnostic Laboratory                 Department of Neurology                                                                                                         Test Date:  2023    Patient: Gosia Millan : 1975 Physician: Jefferson Musa MD   Sex: Male AGE: 47 year Ref Phys: Dimitri Ward MD   ID#: [id number]   Technician: Noel Naranjo     History and Examination:    47-year-old man with complaints of intermittent paresthesias in his hands and feet bilaterally, as well as pain of the left neck and shoulder, radiating towards the forearm and the entire hand.  Query polyneuropathy, entrapment neuropathies of bilateral upper extremities, or left cervical radiculopathy.    Results:    Left fibular (EDB), tibial (AH), bilateral median (APB) and bilateral ulnar (ADM) motor nerve conduction studies were all normal.  Bilateral median to ulnar second lumbrical/palmar interossei comparison motor nerve conduction studies were normal.  Bilateral median, ulnar, radial, left sural, and left superficial fibular antidromic sensory nerve conduction studies were normal.  Bilateral median and ulnar orthodromic (Palmar) mixed nerve conduction studies were normal.  Needle EMG of the left FDI, triceps, deltoid, biceps, pronator teres, and C7 paraspinal muscles, were normal.    Interpretation:    Normal study.  No electrodiagnostic evidence of large fiber polyneuropathy, carpal tunnel syndrome or ulnar neuropathy on either side, or left cervical radiculopathy.    ___________________________  Jefferson Musa MD        Nerve  Conduction Studies  Motor Sites      Latency Amplitude Neg. Amp Diff Segment Distance Velocity Neg. Dur Neg Area Diff Temperature Comment   Site (ms) Norm (mV) Norm %  cm m/s Norm ms %  C    Left Fibular (EDB) Motor   Ankle 5.0  < 6.0 5.8  > 2.5  Ankle-EDB 8   6.0  32.9 trtila 2 is acc per check, .98 mV   Bel Fib Head 14.2 - 6.7 - 15.5 Bel Fib Head-Ankle 39.7 43  > 38 5.8 18.9 34.7    Pop Fossa 15.8 - 6.8 - 1.49 Pop Fossa-Bel Fib Head 6.8 43  > 38 6.1 5.1 34.4    Left Median (APB) Motor   Wrist 3.7  < 4.4 10.3  > 5.0  Wrist-APB 8   6.8  33.7    Elbow 8.1 - 10.3  > 5.0 0 Elbow-Wrist - -  > 48 6.8 0.48 33.7    Right Median (APB) Motor   Wrist 3.9  < 4.4 9.1  > 5.0  Wrist-APB 8   6.7  32.3    Elbow 8.0 - 9.1  > 5.0 0 Elbow-Wrist 22.7 55  > 48 6.9 -1.46 32.3    Left Median/Ulnar (Lumb-Dors Int II) Motor        Median (Lumb I)   Wrist 3.4 - 1.18 -  Wrist-Lumb I 10   6.4  33.7         Ulnar (Dorsal Int (manus))   Wrist 3.2 - 3.0 -  Wrist-Dorsal Int (manus) 10   6.3  33.9    Right Median/Ulnar (Lumb-Dors Int II) Motor        Median (Lumb I)   Wrist 3.4 - 2.5 -  Wrist-Lumb I 10   6.4  32         Ulnar (Dorsal Int (manus))   Wrist 3.1 - 5.9 -  Wrist-Dorsal Int (manus) 10   6.2  32    Left Tibial (AHB) Motor   Ankle 3.8  < 6.5 5.5  > 5.0  Ankle-AHB 8   7.9  32.9    Knee 13.6 - 3.5 - -36.4 Knee-Ankle 39.6 40  > 38 8.9 -17.7 32.6    Left Ulnar (ADM) Motor   Wrist 3.5  < 3.5 10.6  > 5.0  Wrist-ADM 0.2   6.5  32.8    Bel Elbow 7.3 - 10.2 - -3.8 Bel Elbow-Wrist 20 53  > 48 6.8 -1.67 32.6    Abv Elbow 9.9 - 9.3 - -8.8 Abv Elbow-Bel Elbow 13 50  > 48 6.9 -7.9 32.5    Right Ulnar (ADM) Motor   Wrist 3.3  < 3.5 9.6  > 5.0  Wrist-ADM 8   5.8  33.2    Bel Elbow 7.0 - 8.8 - -8.3 Bel Elbow-Wrist 20.4 55  > 48 6.1 -4.2 32.9    Abv Elbow 9.3 - 8.3 - -5.7 Abv Elbow-Bel Elbow 11.8 51  > 48 6.7 0.33 32.6      Sensory Sites      Onset Lat Peak Lat Amp (O-P) Amp (P-P) Segment Distance Velocity Temperature Comment   Site ms ms  V Norm  V  cm m/s  Norm  C    Left Median Sensory   Wrist-Dig II 2.6 3.4 53  > 10 91 Wrist-Dig II 14 54  > 48 32.7    Right Median Sensory   Wrist-Dig II 2.5 3.2 55  > 10 86 Wrist-Dig II 14 56  > 48 32    Left Median-Ulnar Palmar Sensory        Median   Palm-Wrist 1.43 1.88 75 - 95 Palm-Wrist 8 56 - 32.9         Ulnar   Palm-Wrist 1.45 2.1 21 - 25 Palm-Wrist 8 55 - 33.2    Right Median-Ulnar Palmar Sensory        Median   Palm-Wrist 1.60 2.1 38 - 37 Palm-Wrist 8 50 - 32.2         Ulnar   Palm-Wrist 1.48 2.1 24 - 32 Palm-Wrist 8 54 - 32.1    Left Radial Sensory   Forearm-Wrist 1.63 2.2 48  > 15 54 Forearm-Wrist 10 61 - 32.3    Right Radial Sensory   Forearm-Wrist 1.58 2.1 49  > 15 60 Forearm-Wrist 10 63 - 32    Left Superficial Fibular Sensory   14 cm-Ankle 2.9 3.8 13  > 3 20 14 cm-Ankle 12.5 43  > 38 32.2    Left Sural Sensory   Calf-Lat Mall 2.9 3.8 16  > 5 19 Calf-Lat Mall 14 48  > 38 32.1    Left Ulnar Sensory   Wrist-Dig V 2.5 3.4 34  > 8 57 Wrist-Dig V 12.5 50  > 48 32.8    Right Ulnar Sensory   Wrist-Dig V 2.4 3.0 28  > 8 49 Wrist-Dig V 12.5 52  > 48 32.8      Inter-Nerve Comparisons     Nerve 1 Value 1 Nerve 2 Value 2 Parameter Result Normal   Sensory Sites   R Median Palm-Wrist 2.1 ms R Ulnar Palm-Wrist 2.1 ms Peak Lat Diff 0.00 ms <0.40   L Median Palm-Wrist 1.9 ms L Ulnar Palm-Wrist 2.1 ms Peak Lat Diff 0.22 ms <0.40       Electromyography     Side Muscle Ins Act Fibs/PSW Fasc HF Amp Dur Poly Recrt Int Pat   Left Triceps Nml None Nml 0 Nml Nml 0 Nml Nml   Left Deltoid Nml None Nml 0 Nml Nml 0 Nml Nml   Left Biceps Nml None Nml 0 Nml Nml 0 Nml Nml   Left Pronator Teres Nml None Nml 0 Nml Nml 0 Nml Nml   Left FDI Nml None Nml 0 Nml Nml 0 Nml Nml   Left C7 Parasp Nml None Nml 0              NCS Waveforms:    Motor                           Sensory                                    Ultrasound Images:                  Again, thank you for allowing me to participate in the care of your patient.      Sincerely,    Jefferson  Stanley Musa MD

## 2023-06-01 ENCOUNTER — TELEPHONE (OUTPATIENT)
Dept: NEUROLOGY | Facility: CLINIC | Age: 48
End: 2023-06-01
Payer: COMMERCIAL

## 2023-06-01 NOTE — TELEPHONE ENCOUNTER
Patient is on scheduled tomorrow 6/2 at noon. appointment opened today at 10:30 am. Will call patient to see if he is available today     Left message with my desk number to call me back if he is interested in appt today   
Negative

## 2023-06-01 NOTE — PROGRESS NOTES
ESTABLISHED PATIENT NEUROLOGY NOTE    DATE OF VISIT: 6/2/2023  CLINIC LOCATION: [facility]  MRN: [id number]  PATIENT NAME: Gosia Millan  YOB: 1975    REASON FOR VISIT: No chief complaint on file.    SUBJECTIVE:                                                      HISTORY OF PRESENT ILLNESS: Patient is here to follow up regarding limb paresthesias and left neck/shoulder pain radiating to the left arm.  The last visit was on 3/15/2023.  At that time EMG was ordered along with referral to PT and vitamin B12 replacement.  Please refer to my initial/other prior notes for further information.    Since the last visit, the patient reports ***.  He feels that B12 replacement ***.  Physical therapy ***.  He denies interval development of new neurological symptoms.    EMG from 5/30/2023 was normal without evidence of large fiber polyneuropathy, carpal tunnel syndrome or ulnar neuropathy on either side, or left cervical radiculopathy.  Tabulated data from EMG report were personally reviewed and independently interpreted.  EXAM:                                                    Physical Exam:   Vitals: There were no vitals taken for this visit.    General: pt is in NAD, cooperative.  Skin: normal turgor, moist mucous membranes, no lesions/rashes noticed.  HEENT: ATNC, white sclera, normal conjunctiva.  Respiratory: Symmetric lung excursion, no accessory respiratory muscle use.  Abdomen: Non distended.  Neurological: awake, cooperative, follows commands, no aphasia or dysarthria noted, cranial nerves II-XII: no ptosis,  face is symmetric, equally moves all extremities, no dysmetria bilaterally, casual gait is normal.  ASSESSMENT AND PLAN:                                                    Assessment: 47 year old male patient presents for follow-up of limb paresthesias and left neck/shoulder pain radiating to the left arm after the completion of EMG.    Diagnoses:  No diagnosis  found.  Plan:  There are no Patient Instructions on file for this visit.    Total Time: *** minutes spent on the date of the encounter doing chart review, history and exam, documentation and further activities per the note.    Dimitri Ward MD  [facility]  (Chart documentation was completed in part with Dragon voice-recognition software. Even though reviewed, some grammatical, spelling, and word errors may remain.)

## 2023-06-02 ENCOUNTER — OFFICE VISIT (OUTPATIENT)
Dept: NEUROLOGY | Facility: CLINIC | Age: 48
End: 2023-06-02
Payer: COMMERCIAL

## 2023-06-02 VITALS — OXYGEN SATURATION: 100 % | SYSTOLIC BLOOD PRESSURE: 138 MMHG | DIASTOLIC BLOOD PRESSURE: 81 MMHG | HEART RATE: 88 BPM

## 2023-06-02 DIAGNOSIS — R20.2 PARESTHESIA OF UPPER AND LOWER EXTREMITIES OF BOTH SIDES: ICD-10-CM

## 2023-06-02 DIAGNOSIS — M54.2 CERVICAL PAIN: Primary | ICD-10-CM

## 2023-06-02 DIAGNOSIS — R20.2 ARM PARESTHESIA, LEFT: ICD-10-CM

## 2023-06-02 PROCEDURE — 99213 OFFICE O/P EST LOW 20 MIN: CPT | Performed by: PSYCHIATRY & NEUROLOGY

## 2023-06-02 NOTE — PROGRESS NOTES
Gosia Millan is a 47 year old male who presents for:  Chief Complaint   Patient presents with     Follow Up     EMG review        Initial Vitals:  /81 (BP Location: Right arm, Patient Position: Sitting, Cuff Size: Adult Regular)   Pulse 88   SpO2 100%  Estimated body mass index is 23.19 kg/m  as calculated from the following:    Height as of 3/28/23: 1.829 m (6').    Weight as of 4/27/23: 77.6 kg (171 lb).. There is no height or weight on file to calculate BSA. BP completed using cuff size: regular    Juliette Meade

## 2023-06-02 NOTE — PROGRESS NOTES
ESTABLISHED PATIENT NEUROLOGY NOTE    DATE OF VISIT: 6/2/2023  CLINIC LOCATION: [facility]  MRN: [id number]  PATIENT NAME: Gosia Millan  YOB: 1975    REASON FOR VISIT:   Chief Complaint   Patient presents with     Follow Up     EMG review     SUBJECTIVE:                                                      HISTORY OF PRESENT ILLNESS: Patient is here to follow up regarding limb paresthesias and left neck/shoulder pain radiating to the left arm.  The last visit was on 3/15/2023.  At that time EMG was ordered along with referral to PT and vitamin B12 replacement.  Please refer to my initial/other prior notes for further information.    Since the last visit, the patient reports improvement.  He reports that his left shoulder/arm pain is significantly better, almost resolved.  Generalized numbness and tingling are improving.  He feels that B12 replacement is helpful.  Physical therapy was beneficial, and he continues to do shown exercises at home regularly.  He denies interval development of new neurological symptoms.    EMG from 5/30/2023 was normal without evidence of large fiber polyneuropathy, carpal tunnel syndrome or ulnar neuropathy on either side, or left cervical radiculopathy.  Tabulated data from EMG report were personally reviewed and independently interpreted.  EXAM:                                                    Physical Exam:   Vitals: /81 (BP Location: Right arm, Patient Position: Sitting, Cuff Size: Adult Regular)   Pulse 88   SpO2 100%     General: pt is in NAD, cooperative.  Skin: normal turgor, moist mucous membranes, no lesions/rashes noticed.  HEENT: ATNC, white sclera, normal conjunctiva.  Respiratory: Symmetric lung excursion, no accessory respiratory muscle use.  Abdomen: Non distended.  Neurological: awake, cooperative, follows commands, no aphasia or dysarthria noted, cranial nerves II-XII: no ptosis,  face is symmetric, equally moves all  extremities, no dysmetria bilaterally, casual gait is normal.  ASSESSMENT AND PLAN:                                                    Assessment: 47 year old male patient presents for follow-up of limb paresthesias and left neck/shoulder pain radiating to the left arm after the completion of EMG, which was normal.  Reviewed results together.  I discussed with the patient that no nerve damage was detected on EMG, but symptoms might be caused by irritation of the cervical nerve roots and some of generalized paresthesias could be caused by vitamin B12 deficiency.  He reports that symptoms are improving with current therapy.  For that reason, I do not think that any additional testing is needed at the present time, though we discussed the option of skin biopsy in the future if his generalized paresthesias worsen.    Diagnoses:    ICD-10-CM    1. Cervical pain  M54.2       2. Paresthesia of upper and lower extremities of both sides  R20.2       3. Arm paresthesia, left  R20.2         Plan: At today's visit we thoroughly discussed current symptoms, evaluation results, diagnosis, available treatment options, and the plan.    We decided to monitor his symptoms while he continues B12 replacement and physical therapy exercises.    Next follow-up appointment is in the next 4 months (but we discussed that he could cancel the visit if his symptoms resolve or continue to improve).  On the other hand, he could be seen earlier if needed.    Total Time: 21 minutes spent on the date of the encounter doing chart review, history and exam, documentation and further activities per the note.    Dimitri Ward MD  [facility] Neurology  (Chart documentation was completed in part with Dragon voice-recognition software. Even though reviewed, some grammatical, spelling, and word errors may remain.)

## 2023-06-02 NOTE — LETTER
6/2/2023         RE: Gosia Millan  [address]  [place] [address]        Dear Colleague,    Thank you for referring your patient, Gosia Millan, to the [facility]UR[facility]. Please see a copy of my visit note below.    ESTABLISHED PATIENT NEUROLOGY NOTE    DATE OF VISIT: 6/2/2023  CLINIC LOCATION: [facility]  MRN: [id number]  PATIENT NAME: Gosia Millan  YOB: 1975    REASON FOR VISIT:   Chief Complaint   Patient presents with     Follow Up     EMG review     SUBJECTIVE:                                                      HISTORY OF PRESENT ILLNESS: Patient is here to follow up regarding limb paresthesias and left neck/shoulder pain radiating to the left arm.  The last visit was on 3/15/2023.  At that time EMG was ordered along with referral to PT and vitamin B12 replacement.  Please refer to my initial/other prior notes for further information.    Since the last visit, the patient reports improvement.  He reports that his left shoulder/arm pain is significantly better, almost resolved.  Generalized numbness and tingling are improving.  He feels that B12 replacement is helpful.  Physical therapy was beneficial, and he continues to do shown exercises at home regularly.  He denies interval development of new neurological symptoms.    EMG from 5/30/2023 was normal without evidence of large fiber polyneuropathy, carpal tunnel syndrome or ulnar neuropathy on either side, or left cervical radiculopathy.  Tabulated data from EMG report were personally reviewed and independently interpreted.  EXAM:                                                    Physical Exam:   Vitals: /81 (BP Location: Right arm, Patient Position: Sitting, Cuff Size: Adult Regular)   Pulse 88   SpO2 100%     General: pt is in NAD, cooperative.  Skin: normal turgor, moist mucous membranes, no lesions/rashes noticed.  HEENT: ATNC, white sclera, normal  conjunctiva.  Respiratory: Symmetric lung excursion, no accessory respiratory muscle use.  Abdomen: Non distended.  Neurological: awake, cooperative, follows commands, no aphasia or dysarthria noted, cranial nerves II-XII: no ptosis,  face is symmetric, equally moves all extremities, no dysmetria bilaterally, casual gait is normal.  ASSESSMENT AND PLAN:                                                    Assessment: 47 year old male patient presents for follow-up of limb paresthesias and left neck/shoulder pain radiating to the left arm after the completion of EMG, which was normal.  Reviewed results together.  I discussed with the patient that no nerve damage was detected on EMG, but symptoms might be caused by irritation of the cervical nerve roots and some of generalized paresthesias could be caused by vitamin B12 deficiency.  He reports that symptoms are improving with current therapy.  For that reason, I do not think that any additional testing is needed at the present time, though we discussed the option of skin biopsy in the future if his generalized paresthesias worsen.    Diagnoses:    ICD-10-CM    1. Cervical pain  M54.2       2. Paresthesia of upper and lower extremities of both sides  R20.2       3. Arm paresthesia, left  R20.2         Plan: At today's visit we thoroughly discussed current symptoms, evaluation results, diagnosis, available treatment options, and the plan.    We decided to monitor his symptoms while he continues B12 replacement and physical therapy exercises.    Next follow-up appointment is in the next 4 months (but we discussed that he could cancel the visit if his symptoms resolve or continue to improve).  On the other hand, he could be seen earlier if needed.    Total Time: 21 minutes spent on the date of the encounter doing chart review, history and exam, documentation and further activities per the note.    Dimitri Ward MD  [facility] Neurology  (Chart documentation  was completed in part with Dragon voice-recognition software. Even though reviewed, some grammatical, spelling, and word errors may remain.)        Again, thank you for allowing me to participate in the care of your patient.        Sincerely,        Dimitri Ward MD

## 2023-06-02 NOTE — PATIENT INSTRUCTIONS
AFTER VISIT SUMMARY (AVS):    At today's visit we thoroughly discussed current symptoms, evaluation results, diagnosis, available treatment options, and the plan.    We decided to monitor your symptoms while you continue B12 replacement and physical therapy exercises.    Next follow-up appointment is in the next 4 months (but we discussed that you could cancel the visit if your symptoms resolve or continue to improve).  On the other hand, you could be seen earlier if needed.    Please do not hesitate to call me with any questions or concerns.    Thanks.

## 2023-08-24 NOTE — ADDENDUM NOTE
Encounter addended by: Desire Roberson, PT on: 8/24/2023 11:07 AM   Actions taken: Episode resolved, Clinical Note Signed, Flowsheet accepted

## 2023-08-24 NOTE — PROGRESS NOTES
05/12/23 1000   Appointment Info   Signing clinician's name / credentials Desire Roberson, PT, DPT, CLT-ALISON   Visits Used 5   Progress Note/Certification   Progress Note Due Date 06/19/23   Subjective Report   Subjective Report Pt reports no pain into his arm but does have burning into his arm. Doing the PT exercises reduces the burning sensation. He is not having to take tylenol he just using the exercises.  Pt is feeling good about today being his last session. Pain: 2/10 left scapular region   Objective Measures   Objective Measures Objective Measure 1;Objective Measure 2   Objective Measure 1   Objective Measure Deep cervical neck flexor endurance: + at 25 seconds   Details Cervical AROM: Flexion: chin to chest, Ext: WNL, Rotation: R WNL, L WNL, Lateral flexion: R WNL, L WNL   Objective Measure 2   Objective Measure ULTT median nerve: negative on L   Treatment Interventions (PT)   Interventions Therapeutic Procedure/Exercise;Self Care/Home Management   Therapeutic Procedure/Exercise   Therapeutic Procedures: strength, endurance, ROM, flexibillity minutes (71407) 32   Skilled Intervention Progression of HEP for further scapular, cervical strengthening and mobility   Patient Response/Progress tolerated well, good performance, did not require cueing to prevent upper trapezius activation   Self Care/home Management   ADL/Home Mgmt Training (69091) 8   Patient Response/Progress pt verbalized understanding   Treatment Detail education on how to manage flare ups in pain with regards to activity, ice/heat, normal timeline for recovery, red flag symptoms, and when to return to PT   Education   Learner/Method Patient   Plan   Home program PTRx   Plan for next session discharge to self-management and HEP, leave chart open for 1 month then formal DC, goals met or nearing   Medicare Claim Information   Medical Diagnosis Cervical pain   PT Diagnosis midline cervical pain with (L) radiculopathy,   Start of Care Date  "03/21/23   Onset date of current episode/exacerbation   (12/2022, ref date 3/15/23)   Ortho Goal 1   Goal Identifier HEP   Goal Description Patient will be independent with home exercise program and self management of symptoms in 12 weeks.   Goal Progress Met- will continue to progress as needed   Date Met 05/05/23   Ortho Goal 2   Goal Identifier neck pain   Goal Description patient will report 50% reduction in neck pain in 8 weeks.   Goal Progress Met   Date Met 05/12/23   Ortho Goal 3   Goal Identifier neck crepitus   Goal Description Patient will report 50% reduction in cervical spine crepitus in 12 weeks.   Goal Progress improving- about 40% better, no longer painful- just a \"click\"   PT Outcome Measures   Outcome Measures   (NDI: 4%)         DISCHARGE  Reason for Discharge: See above for complete progress towards goals. Pt appropriate for discharge to self-management.    Equipment Issued: none    Discharge Plan: Patient to continue home program.    Referring Provider:  Dimitri Pena*    "

## 2024-02-25 ENCOUNTER — HEALTH MAINTENANCE LETTER (OUTPATIENT)
Age: 49
End: 2024-02-25

## 2024-08-20 ENCOUNTER — PATIENT OUTREACH (OUTPATIENT)
Dept: CARE COORDINATION | Facility: CLINIC | Age: 49
End: 2024-08-20
Payer: COMMERCIAL

## 2024-08-28 ENCOUNTER — OFFICE VISIT (OUTPATIENT)
Dept: FAMILY MEDICINE | Facility: CLINIC | Age: 49
End: 2024-08-28
Payer: COMMERCIAL

## 2024-08-28 VITALS
SYSTOLIC BLOOD PRESSURE: 126 MMHG | DIASTOLIC BLOOD PRESSURE: 82 MMHG | TEMPERATURE: 99.3 F | HEART RATE: 104 BPM | OXYGEN SATURATION: 100 % | RESPIRATION RATE: 18 BRPM | WEIGHT: 167 LBS | HEIGHT: 70 IN | BODY MASS INDEX: 23.91 KG/M2

## 2024-08-28 DIAGNOSIS — E78.6 HDL LIPOPROTEIN DEFICIENCY: ICD-10-CM

## 2024-08-28 DIAGNOSIS — R03.0 WHITE COAT SYNDROME WITHOUT DIAGNOSIS OF HYPERTENSION: ICD-10-CM

## 2024-08-28 DIAGNOSIS — R73.03 PRE-DIABETES: ICD-10-CM

## 2024-08-28 DIAGNOSIS — H02.883 DRY EYE SYNDROME OF BOTH EYES DUE TO MEIBOMIAN GLAND DYSFUNCTION: ICD-10-CM

## 2024-08-28 DIAGNOSIS — E55.9 VITAMIN D DEFICIENCY: ICD-10-CM

## 2024-08-28 DIAGNOSIS — H04.123 DRY EYE SYNDROME OF BOTH EYES DUE TO MEIBOMIAN GLAND DYSFUNCTION: ICD-10-CM

## 2024-08-28 DIAGNOSIS — H26.8 OTHER CATARACT OF BOTH EYES: ICD-10-CM

## 2024-08-28 DIAGNOSIS — Z01.818 PREOP GENERAL PHYSICAL EXAM: Primary | ICD-10-CM

## 2024-08-28 DIAGNOSIS — H02.886 DRY EYE SYNDROME OF BOTH EYES DUE TO MEIBOMIAN GLAND DYSFUNCTION: ICD-10-CM

## 2024-08-28 PROBLEM — K59.00 CONSTIPATION, UNSPECIFIED CONSTIPATION TYPE: Status: RESOLVED | Noted: 2019-09-13 | Resolved: 2024-08-28

## 2024-08-28 PROBLEM — A77.40 EHRLICHIOSIS: Status: RESOLVED | Noted: 2022-10-30 | Resolved: 2024-08-28

## 2024-08-28 LAB
ANION GAP SERPL CALCULATED.3IONS-SCNC: 12 MMOL/L (ref 7–15)
BUN SERPL-MCNC: 15.9 MG/DL (ref 6–20)
CALCIUM SERPL-MCNC: 9.5 MG/DL (ref 8.8–10.4)
CHLORIDE SERPL-SCNC: 103 MMOL/L (ref 98–107)
CHOLEST SERPL-MCNC: 189 MG/DL
CREAT SERPL-MCNC: 0.82 MG/DL (ref 0.67–1.17)
EGFRCR SERPLBLD CKD-EPI 2021: >90 ML/MIN/1.73M2
FASTING STATUS PATIENT QL REPORTED: YES
FASTING STATUS PATIENT QL REPORTED: YES
GLUCOSE SERPL-MCNC: 117 MG/DL (ref 70–99)
HBA1C MFR BLD: 5.7 % (ref 0–5.6)
HCO3 SERPL-SCNC: 25 MMOL/L (ref 22–29)
HDLC SERPL-MCNC: 38 MG/DL
LDLC SERPL CALC-MCNC: 124 MG/DL
NONHDLC SERPL-MCNC: 151 MG/DL
POTASSIUM SERPL-SCNC: 3.9 MMOL/L (ref 3.4–5.3)
SODIUM SERPL-SCNC: 140 MMOL/L (ref 135–145)
TRIGL SERPL-MCNC: 134 MG/DL

## 2024-08-28 PROCEDURE — 36415 COLL VENOUS BLD VENIPUNCTURE: CPT | Performed by: PHYSICIAN ASSISTANT

## 2024-08-28 PROCEDURE — 99214 OFFICE O/P EST MOD 30 MIN: CPT | Performed by: PHYSICIAN ASSISTANT

## 2024-08-28 PROCEDURE — 80048 BASIC METABOLIC PNL TOTAL CA: CPT | Performed by: PHYSICIAN ASSISTANT

## 2024-08-28 PROCEDURE — 82306 VITAMIN D 25 HYDROXY: CPT | Performed by: PHYSICIAN ASSISTANT

## 2024-08-28 PROCEDURE — 80061 LIPID PANEL: CPT | Performed by: PHYSICIAN ASSISTANT

## 2024-08-28 PROCEDURE — 83036 HEMOGLOBIN GLYCOSYLATED A1C: CPT | Performed by: PHYSICIAN ASSISTANT

## 2024-08-28 RX ORDER — CARBOXYMETHYLCELLULOSE SODIUM 5 MG/ML
1 SOLUTION/ DROPS OPHTHALMIC 3 TIMES DAILY PRN
COMMUNITY
End: 2024-08-29

## 2024-08-28 SDOH — HEALTH STABILITY: PHYSICAL HEALTH: ON AVERAGE, HOW MANY DAYS PER WEEK DO YOU ENGAGE IN MODERATE TO STRENUOUS EXERCISE (LIKE A BRISK WALK)?: 2 DAYS

## 2024-08-28 ASSESSMENT — SOCIAL DETERMINANTS OF HEALTH (SDOH): HOW OFTEN DO YOU GET TOGETHER WITH FRIENDS OR RELATIVES?: ONCE A WEEK

## 2024-08-28 ASSESSMENT — PAIN SCALES - GENERAL: PAINLEVEL: MILD PAIN (3)

## 2024-08-28 NOTE — PROGRESS NOTES
Preoperative Evaluation  [address] [address]  Phone: [phone]  Primary Provider: CAROLE Butt CNP  Pre-op Performing Provider: ELISABETH Mathews  Aug 28, 2024             8/28/2024   Surgical Information   What procedure is being done? PHACO IOL/Cataracts - Both eyes   Facility or Hospital where procedure/surgery will be performed: [facility]   Who is doing the procedure / surgery? Eladio Moe MD   Date of surgery / procedure: 9/11/2024   Time of surgery / procedure: N/A   Where do you plan to recover after surgery? at home with family      Fax number for surgical facility: [phone]    Assessment & Plan     The proposed surgical procedure is considered LOW risk.    Preop general physical exam  Cleared for procedure     White coat syndrome without diagnosis of hypertension  Chronic, at home blood pressures are normal.   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  Uses otc eye drops, stable    HDL lipoprotein deficiency  Stable, not on any medication at this time  - Lipid panel reflex to direct LDL Fasting; Future    Pre-diabetes  Stable, not on any medicatons at this time  - Hemoglobin A1c; Future  - Basic metabolic panel  (Ca, Cl, CO2, Creat, Gluc, K, Na, BUN); Future    Other cataract of both eyes  Surgery schedule for 9/11/24            - No identified additional risk factors other than previously addressed    Preoperative Medication Instructions  Antiplatelet or Anticoagulation Medication Instructions   - Patient is on no antiplatelet or anticoagulation medications.  - HOLD on NSAID's 7 days prior to procedure   Additional Medication Instructions  Patient is on no additional chronic medications    Recommendation  Approval given to proceed with proposed procedure, without further diagnostic evaluation.    Subjective    Gosia is a 48 year old, presenting for the following:  Physical            8/28/2024    10:01 AM   Patient Reported Additional Medications   Patient reports taking the following new medications Tylenol     HPI related to upcoming procedure: Has been having decreased vision and changes slowly over the years. Eye doctor advised cataract surgery         8/28/2024   Pre-Op Questionnaire   Have you ever had a heart attack or stroke? No   Have you ever had surgery on your heart or blood vessels, such as a stent placement, a coronary artery bypass, or surgery on an artery in your head, neck, heart, or legs? No   Do you have chest pain with activity? No   Do you have a history of heart failure? No   Do you currently have a cold, bronchitis or symptoms of other infection? No   Do you have a cough, shortness of breath, or wheezing? No   Do you or anyone in your family have previous history of blood clots? No   Do you or does anyone in your family have a serious bleeding problem such as prolonged bleeding following surgeries or cuts? No   Have you ever had problems with anemia or been told to take iron pills? No   Have you had any abnormal blood loss such as black, tarry or bloody stools? No   Have you ever had a blood transfusion? No   Are you willing to have a blood transfusion if it is medically needed before, during, or after your surgery? Yes   Have you or any of your relatives ever had problems with anesthesia? No   Do you have sleep apnea, excessive snoring or daytime drowsiness? No   Do you have any artifical heart valves or other implanted medical devices like a pacemaker, defibrillator, or continuous glucose monitor? No   Do you have artificial joints? No   Are you allergic to latex? No      Health Care Directive  Patient does not have a Health Care Directive or Living Will: Discussed advance care planning with patient; however, patient declined at this time.    Preoperative Review of    reviewed - no record  of controlled substances prescribed.      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 06/03/2022     Priority: Medium    Pre-diabetes 03/04/2019     Priority: Medium    HDL lipoprotein deficiency 06/11/2010     Priority: Medium      Past Medical History:   Diagnosis Date    Arrhythmia     Multiple joint pain     Pre-diabetes     Sinus tachycardia      Past Surgical History:   Procedure Laterality Date    COLONOSCOPY N/A 3/28/2023    Procedure: COLONOSCOPY WITH BIOPSY;  Surgeon: Eladio Acuña MD;  Location: [facility] OR     Current Outpatient Medications   Medication Sig Dispense Refill    carboxymethylcellulose PF (REFRESH PLUS) 0.5 % ophthalmic solution 1 drop 3 times daily as needed for dry eyes.      bisacodyl (DULCOLAX) 5 MG EC tablet 2 days prior to procedure, take 2 tablets at 4 pm. 1 day prior to procedure, take 2 tablets at 4 pm. For additional instructions refer to your colonoscopy prep instructions. (Patient not taking: Reported on 2/7/2023) 4 tablet 0    naproxen (NAPROSYN) 500 MG tablet Take 500 mg by mouth 2 times daily (with meals) (Patient not taking: Reported on 8/28/2024)      polyethylene glycol (GOLYTELY) 236 g suspension 2 days prior at 5pm, mix and drink half of a jug of Golytely. Drink an 8 oz. glass of Golytely every 15 minutes until half of the jug is gone. Place remainder of Golytely in the refrigerator. 1 day prior at 5 pm, drink the 2nd half of a jug of Golytely bowel prep. 6 hours before your check-in time, drink an 8 oz. glass of Golytely every 15 minutes until half of the 2nd jug of Golytely is gone. Discard remainder of second jug. (Patient not taking: Reported on 2/7/2023) 8000 mL 0       No Known Allergies     Social History     Tobacco Use     "Smoking status: Never     Passive exposure: Never    Smokeless tobacco: Never   Substance Use Topics    Alcohol use: Yes     Comment: once every month        History   Drug Use No             Review of Systems  Constitutional, HEENT, cardiovascular, pulmonary, GI, , musculoskeletal, neuro, skin, endocrine and psych systems are negative, except as otherwise noted.    Objective    BP (!) 139/91   Pulse 104   Temp 99.3  F (37.4  C) (Oral)   Resp 18   Ht 1.788 m (5' 10.39\")   Wt 75.8 kg (167 lb)   SpO2 100%   BMI 23.69 kg/m     Estimated body mass index is 23.69 kg/m  as calculated from the following:    Height as of this encounter: 1.788 m (5' 10.39\").    Weight as of this encounter: 75.8 kg (167 lb).  Physical Exam  GENERAL: alert and no distress  EYES: Eyes grossly normal to inspection, PERRL and conjunctivae and sclerae normal  HENT: ear canals and TM's normal, nose and mouth without ulcers or lesions  NECK: no adenopathy, no asymmetry, masses, or scars  RESP: lungs clear to auscultation - no rales, rhonchi or wheezes  CV: regular rate and rhythm, normal S1 S2, no S3 or S4, no murmur, click or rub, no peripheral edema  ABDOMEN: soft, nontender, no hepatosplenomegaly, no masses and bowel sounds normal  MS: no gross musculoskeletal defects noted, no edema  SKIN: no suspicious lesions or rashes  NEURO: Normal strength and tone, mentation intact and speech normal  PSYCH: mentation appears normal, affect normal/bright    No results for input(s): \"HGB\", \"PLT\", \"INR\", \"NA\", \"POTASSIUM\", \"CR\", \"A1C\" in the last 8760 hours.     Diagnostics  Labs pending at this time.  Results will be reviewed when available.   No EKG required for low risk surgery (cataract, skin procedure, breast biopsy, etc).    Revised Cardiac Risk Index (RCRI)  The patient has the following serious cardiovascular risks for perioperative complications:   - No serious cardiac risks = 0 points     RCRI Interpretation: 0 points: Class I (very low " risk - 0.4% complication rate)         Signed Electronically by: ELISABETH Mathews  A copy of this evaluation report is provided to the requesting physician.

## 2024-08-28 NOTE — PROGRESS NOTES
Preventive Care Visit  [facility]  ELISABETH Mathews, Physician Assistant - Medical  Aug 28, 2024  {Provider  Link to SmartSet :602892}    {PROVIDER CHARTING PREFERENCE:346929}    Nikki Elise is a 48 year old, presenting for the following:  Physical         Health Care Directive  Patient does not have a Health Care Directive or Living Will: {ADVANCE_DIRECTIVE_STATUS:511223}    HPI  ***    {SUPERLIST (Optional):287150}  {additonal problems for provider to add (Optional):711012}      8/28/2024   General Health   How would you rate your overall physical health? Good   Feel stress (tense, anxious, or unable to sleep) Not at all            8/28/2024   Nutrition   Three or more servings of calcium each day? (!) I DON'T KNOW   Diet: Low salt    Breakfast skipped   How many servings of fruit and vegetables per day? (!) 0-1   How many sweetened beverages each day? 0-1       Multiple values from one day are sorted in reverse-chronological order         8/28/2024   Exercise   Days per week of moderate/strenous exercise 2 days      (!) EXERCISE CONCERN      8/28/2024   Social Factors   Frequency of gathering with friends or relatives Once a week   Worry food won't last until get money to buy more No   Food not last or not have enough money for food? No   Do you have housing? (Housing is defined as stable permanent housing and does not include staying ouside in a car, in a tent, in an abandoned building, in an overnight shelter, or couch-surfing.) Yes   Are you worried about losing your housing? No   Lack of transportation? No   Unable to get utilities (heat,electricity)? No            8/28/2024   Dental   Dentist two times every year? Yes            8/28/2024   TB Screening   Were you born outside of the [place]? Yes            Today's PHQ-2 Score:       8/28/2024     9:44 AM   PHQ-2 ( 1999 [employer])   Q1: Little interest or pleasure in doing things 0   Q2: Feeling down, depressed or hopeless 0    PHQ-2 Score 0   Q1: Little interest or pleasure in doing things Not at all   Q2: Feeling down, depressed or hopeless Not at all   PHQ-2 Score 0           8/28/2024   Substance Use   Alcohol more than 3/day or more than 7/wk No   Do you use any other substances recreationally? No        Social History     Tobacco Use    Smoking status: Never    Smokeless tobacco: Never   Vaping Use    Vaping status: Never Used   Substance Use Topics    Alcohol use: Yes     Comment: once every month     Drug use: No     {Provider  If there are gaps in the social history shown above, please follow the link to update and then refresh the note Link to Social and Substance History :494050}      8/28/2024   STI Screening   New sexual partner(s) since last STI/HIV test? No      ASCVD Risk   The ASCVD Risk score (Daquan LINTON, et al., 2019) failed to calculate for the following reasons:    The systolic blood pressure is missing        8/28/2024   Contraception/Family Planning   Questions about contraception or family planning No        {Provider  REQUIRED FOR AWV Use the storyboard to review patient history, after sections have been marked as reviewed, refresh note to capture documentation:860849}   Reviewed and updated as needed this visit by Provider                    {HISTORY OPTIONS (Optional):006107}    {ROS Picklists (Optional):707841}     Objective    Exam  There were no vitals taken for this visit.   Estimated body mass index is 23.19 kg/m  as calculated from the following:    Height as of 3/28/23: 1.829 m (6').    Weight as of 4/27/23: 77.6 kg (171 lb).    Physical Exam  {Exam Choices (Optional):856022}        Signed Electronically by: ELISABETH Mathews  {Email feedback regarding this note to [email or web address]   :725437}

## 2024-08-29 ENCOUNTER — OFFICE VISIT (OUTPATIENT)
Dept: FAMILY MEDICINE | Facility: CLINIC | Age: 49
End: 2024-08-29
Payer: COMMERCIAL

## 2024-08-29 VITALS
SYSTOLIC BLOOD PRESSURE: 130 MMHG | HEART RATE: 72 BPM | HEIGHT: 70 IN | DIASTOLIC BLOOD PRESSURE: 86 MMHG | TEMPERATURE: 97.9 F | RESPIRATION RATE: 16 BRPM | OXYGEN SATURATION: 100 % | WEIGHT: 167 LBS | BODY MASS INDEX: 23.91 KG/M2

## 2024-08-29 DIAGNOSIS — R73.03 PRE-DIABETES: ICD-10-CM

## 2024-08-29 DIAGNOSIS — E55.9 VITAMIN D DEFICIENCY: ICD-10-CM

## 2024-08-29 DIAGNOSIS — E55.9 VITAMIN D DEFICIENCY: Primary | ICD-10-CM

## 2024-08-29 DIAGNOSIS — Z00.00 ENCOUNTER FOR ANNUAL PHYSICAL EXAM: Primary | ICD-10-CM

## 2024-08-29 LAB — VIT D+METAB SERPL-MCNC: 21 NG/ML (ref 20–50)

## 2024-08-29 PROCEDURE — 99396 PREV VISIT EST AGE 40-64: CPT | Performed by: FAMILY MEDICINE

## 2024-08-29 RX ORDER — CHOLECALCIFEROL (VITAMIN D3) 50 MCG
1 TABLET ORAL DAILY
Qty: 90 TABLET | Refills: 3 | Status: SHIPPED | OUTPATIENT
Start: 2024-08-29

## 2024-08-29 SDOH — HEALTH STABILITY: PHYSICAL HEALTH: ON AVERAGE, HOW MANY DAYS PER WEEK DO YOU ENGAGE IN MODERATE TO STRENUOUS EXERCISE (LIKE A BRISK WALK)?: 3 DAYS

## 2024-08-29 ASSESSMENT — SOCIAL DETERMINANTS OF HEALTH (SDOH): HOW OFTEN DO YOU GET TOGETHER WITH FRIENDS OR RELATIVES?: ONCE A WEEK

## 2024-08-29 NOTE — PROGRESS NOTES
Preventive Care Visit  [facility]  Gwen Russ MD, Family Medicine  Aug 29, 2024      Assessment & Plan     Encounter for annual physical exam   Discussed diet, exercise, wellness and other preventive recommendations related to health maintenance.   Follow up as needed for acute issues.    Physical exam recommended in one year.     Declined Tdap today.    Pre-diabetes  A1c at 5.7  Advised patient with diet, exercise, diet modification.  Patient reports both parents are diabetic.    Vitamin D deficiency    - Vitamin D Deficiency; Future    Patient has been advised of split billing requirements and indicates understanding: Yes        Counseling  Appropriate preventive services were addressed with this patient via screening, questionnaire, or discussion as appropriate for fall prevention, nutrition, physical activity, Tobacco-use cessation, social engagement, weight loss and cognition.  Checklist reviewing preventive services available has been given to the patient.  Reviewed patient's diet, addressing concerns and/or questions.   He is at risk for lack of exercise and has been provided with information to increase physical activity for the benefit of his well-being.       Work on weight loss  Regular exercise    Nikki Elise is a 48 year old, presenting for the following:  Physical    Comes for an annual exam, history of prediabetes, vitamin D deficiency.  Patient had a preop examination, labs yesterday.  Reviewed lab result with him    Health Care Directive  Patient does not have a Health Care Directive or Living Will: Discussed advance care planning with patient; however, patient declined at this time.          8/29/2024   General Health   How would you rate your overall physical health? Good   Feel stress (tense, anxious, or unable to sleep) Not at all            8/29/2024   Nutrition   Three or more servings of calcium each day? Yes   Diet: Regular (no restrictions)   How  many servings of fruit and vegetables per day? (!) 0-1   How many sweetened beverages each day? 0-1            8/29/2024   Exercise   Days per week of moderate/strenous exercise 3 days            8/29/2024   Social Factors   Frequency of gathering with friends or relatives Once a week   Worry food won't last until get money to buy more No   Food not last or not have enough money for food? No   Do you have housing? (Housing is defined as stable permanent housing and does not include staying ouside in a car, in a tent, in an abandoned building, in an overnight shelter, or couch-surfing.) Yes   Are you worried about losing your housing? No   Lack of transportation? No   Unable to get utilities (heat,electricity)? No            8/29/2024   Dental   Dentist two times every year? Yes            8/28/2024   TB Screening   Were you born outside of the [place]? Yes            Today's PHQ-2 Score:       8/29/2024     9:37 AM   PHQ-2 ( 1999 [employer])   Q1: Little interest or pleasure in doing things 0   Q2: Feeling down, depressed or hopeless 0   PHQ-2 Score 0   Q1: Little interest or pleasure in doing things Not at all   Q2: Feeling down, depressed or hopeless Not at all   PHQ-2 Score 0           8/29/2024   Substance Use   Alcohol more than 3/day or more than 7/wk No   Do you use any other substances recreationally? No        Social History     Tobacco Use    Smoking status: Never     Passive exposure: Never    Smokeless tobacco: Never   Vaping Use    Vaping status: Never Used   Substance Use Topics    Alcohol use: Yes     Comment: once every month     Drug use: No           8/29/2024   STI Screening   New sexual partner(s) since last STI/HIV test? No      ASCVD Risk   The 10-year ASCVD risk score (Daquan LINTON, et al., 2019) is: 4.5%    Values used to calculate the score:      Age: 48 years      Sex: Male      Is Non- : Yes      Diabetic: No      Tobacco smoker: No      Systolic Blood Pressure: 126  "mmHg      Is BP treated: No      HDL Cholesterol: 38 mg/dL      Total Cholesterol: 189 mg/dL        8/29/2024   Contraception/Family Planning   Questions about contraception or family planning No           Reviewed and updated as needed this visit by Provider                    Past Medical History:   Diagnosis Date    Arrhythmia     Multiple joint pain     Pre-diabetes     Sinus tachycardia      Past Surgical History:   Procedure Laterality Date    COLONOSCOPY N/A 3/28/2023    Procedure: COLONOSCOPY WITH BIOPSY;  Surgeon: Eladio Acuña MD;  Location: [facility]     OB History   No obstetric history on file.         Review of Systems  Constitutional, HEENT, cardiovascular, pulmonary, GI, , musculoskeletal, neuro, skin, endocrine and psych systems are negative, except as otherwise noted.     Objective    Exam  Pulse 98   Temp 97.9  F (36.6  C) (Oral)   Ht 1.788 m (5' 10.39\")   Wt 75.8 kg (167 lb)   SpO2 100%   BMI 23.69 kg/m     Estimated body mass index is 23.69 kg/m  as calculated from the following:    Height as of this encounter: 1.788 m (5' 10.39\").    Weight as of this encounter: 75.8 kg (167 lb).    Physical Exam  GENERAL: alert and no distress  EYES: Eyes grossly normal to inspection, PERRL and conjunctivae and sclerae normal  HENT: ear canals and TM's normal, nose and mouth without ulcers or lesions  NECK: no adenopathy, no asymmetry, masses, or scars  RESP: lungs clear to auscultation - no rales, rhonchi or wheezes  CV: regular rate and rhythm, normal S1 S2, no S3 or S4, no murmur, click or rub, no peripheral edema  ABDOMEN: soft, nontender, no hepatosplenomegaly, no masses and bowel sounds normal  MS: no gross musculoskeletal defects noted, no edema  SKIN: no suspicious lesions or rashes  NEURO: Normal strength and tone, mentation intact and speech normal  PSYCH: mentation appears normal, affect normal/bright    Orders Placed This Encounter   Procedures    Vitamin D Deficiency    "   Lab Results   Component Value Date    A1C 5.7 08/28/2024    A1C 5.9 10/24/2022    A1C 5.8 05/14/2021    A1C 6.0 09/08/2020    A1C 5.9 05/08/2019    A1C 6.0 03/04/2019         Signed Electronically by: Gwen Russ MD

## 2025-06-01 ENCOUNTER — HOSPITAL ENCOUNTER (OUTPATIENT)
Dept: GENERAL RADIOLOGY | Facility: HOSPITAL | Age: 50
Discharge: HOME | End: 2025-06-01
Payer: COMMERCIAL

## 2025-06-01 ENCOUNTER — OFFICE VISIT (OUTPATIENT)
Dept: URGENT CARE | Facility: URGENT CARE | Age: 50
End: 2025-06-01
Payer: COMMERCIAL

## 2025-06-01 VITALS
WEIGHT: 178 LBS | HEART RATE: 108 BPM | HEIGHT: 72 IN | DIASTOLIC BLOOD PRESSURE: 90 MMHG | RESPIRATION RATE: 16 BRPM | SYSTOLIC BLOOD PRESSURE: 123 MMHG | OXYGEN SATURATION: 99 % | BODY MASS INDEX: 24.11 KG/M2 | TEMPERATURE: 98.1 F

## 2025-06-01 DIAGNOSIS — Z75.8 DOES NOT HAVE PRIMARY CARE PROVIDER: ICD-10-CM

## 2025-06-01 DIAGNOSIS — K59.00 CONSTIPATION, UNSPECIFIED CONSTIPATION TYPE: ICD-10-CM

## 2025-06-01 DIAGNOSIS — R10.84 ABDOMINAL PAIN, GENERALIZED: Primary | ICD-10-CM

## 2025-06-01 LAB
ALBUMIN SERPL-MCNC: 3.9 G/DL (ref 3.4–5)
ALP SERPL-CCNC: 57 U/L (ref 40–150)
ALT SERPL W P-5'-P-CCNC: 21 U/L (ref 0–70)
ANION GAP SERPL CALCULATED.3IONS-SCNC: 9 MMOL/L (ref 3–14)
AST SERPL W P-5'-P-CCNC: 23 U/L (ref 0–45)
BILIRUB SERPL-MCNC: 1.1 MG/DL (ref 0.2–1.3)
BUN SERPL-MCNC: 9 MG/DL (ref 7–30)
CALCIUM SERPL-MCNC: 9.5 MG/DL (ref 8.5–10.1)
CHLORIDE BLD-SCNC: 106 MMOL/L (ref 94–109)
CO2 SERPL-SCNC: 26 MMOL/L (ref 20–32)
CREAT SERPL-MCNC: 0.9 MG/DL (ref 0.66–1.25)
EGFRCR SERPLBLD CKD-EPI 2021: >90 ML/MIN/1.73M2
GLUCOSE BLD-MCNC: 135 MG/DL (ref 70–99)
POTASSIUM BLD-SCNC: 4.8 MMOL/L (ref 3.4–5.3)
PROT SERPL-MCNC: 7.4 G/DL (ref 6.8–8.8)
SODIUM SERPL-SCNC: 141 MMOL/L (ref 135–145)

## 2025-06-01 PROCEDURE — 3080F DIAST BP >= 90 MM HG: CPT

## 2025-06-01 PROCEDURE — 74019 RADEX ABDOMEN 2 VIEWS: CPT

## 2025-06-01 PROCEDURE — 99214 OFFICE O/P EST MOD 30 MIN: CPT

## 2025-06-01 PROCEDURE — 80053 COMPREHEN METABOLIC PANEL: CPT

## 2025-06-01 PROCEDURE — 3074F SYST BP LT 130 MM HG: CPT

## 2025-06-01 PROCEDURE — 36415 COLL VENOUS BLD VENIPUNCTURE: CPT

## 2025-06-01 RX ORDER — FAMOTIDINE 20 MG/1
20 TABLET, FILM COATED ORAL DAILY
Qty: 30 TABLET | Refills: 0 | Status: SHIPPED | OUTPATIENT
Start: 2025-06-01

## 2025-06-01 RX ORDER — POLYETHYLENE GLYCOL 3350 17 G/17G
1 POWDER, FOR SOLUTION ORAL 2 TIMES DAILY
Qty: 510 G | Refills: 0 | Status: SHIPPED | OUTPATIENT
Start: 2025-06-01

## 2025-06-01 NOTE — PROGRESS NOTES
Assessment & Plan     Abdominal pain, generalized  Constipation, unspecified constipation type  Patient presents with 2 days of intermittent, generalized abdominal pain localized to just right underneath his ribs. It does intermittently exacerbate with positional changes, but does not worsen with oral intake. Had not yet eaten this morning. He reports having a known history of constipation. Claims that he had not passed gas since Friday, but did have 1 nonbloody BM this morning with pebble-like stools and had to strain. Patient is worried about having gallstones and or pancreatitis.  Vitally stable here.  Well-appearing and conversing normally. Abdominal exam notable for some mild distention and hypoactive bowel sounds, but otherwise benign.  CMP showed normal LFTs.  No indications to check lipase for pancreatitis based on abdominal exam and does not have risk factors for this. Abdominal x-ray did show moderate stool burden.  Suspect this is most likely related to constipation.  Did consider appendicitis, diverticulitis, SBO, but clinical findings do not support this.  - Plan for MiraLAX twice daily until patient has BM daily and/or starts having diarrhea-like stools.  -Pepcid 20 mg daily for bloating management.  - Discussed reasons to return to urgent care and/or go to the ED.  Patient expressed understanding and agreeable to plan.  - Recommended following up with PCP if no improvement within the next 1 to 2 weeks.  Could consider H. pylori stool testing.    Does not have primary care provider  Patient reports that his PCP had left their practice. He is currently looking for a new one.  Provided patient with list of clinics within the [place] system to seek primary care from.     No follow-ups on file.    Reynaldo Mayorga MD  [facility] PUJA Elise is a 49 year old male who presents to clinic today for the following health issues:  Chief Complaint   Patient presents with     Abdominal Pain     Abdominal pain- mid abdomen comes and goes worse in the mornings since Friday.          6/1/2025    10:17 AM   Additional Questions   Roomed by MARIA ALEJANDRA Bailey   Accompanied by self     HPI    Abdominal pain  Comes and goes, cramping. Not sharp. Does not change w/eating. Some mid-abdominal pain w/positional changes intermittently. Points to right under his ribs.   Started Fri - thought it was just food poisoning. Chills. Subjective fevers - did not measure temperature. Had to leave work because of this on Fri.   Cramping that lasts for ~1min since Sat. Every 4-6h  BM x1 this morning. Non-bloody, not black tarry. Had to strain. No diarrhea. Nausea, no vomiting. Has not passed gas since Friday.  Appetite is good.   Ibuprofen - somewhat helpful.   Known hx of constipation, but this feels different.   Bloating   No changes in urination.    No abdominal surgeries.  Worried about gallstones and pancreatitis.        Review of Systems  Constitutional, HEENT, cardiovascular, pulmonary, gi and gu systems are negative, except as otherwise noted.      Objective    BP (!) 123/90   Pulse 108   Temp 98.1  F (36.7  C) (Oral)   Resp 16   Ht 1.829 m (6')   Wt 80.7 kg (178 lb)   SpO2 99%   BMI 24.14 kg/m    Physical Exam   GENERAL: alert and no distress, well-appearing, conversing normally.  EYES: Eyes grossly normal to inspection  RESP: lungs clear to auscultation - no rales, rhonchi or wheezes  CV: regular rate and rhythm, normal S1 S2, no S3 or S4, no murmur, click or rub, no peripheral edema  ABDOMEN: soft, nontender, no distended, no guarding.  Active bowel sounds.  No hepatosplenomegaly, no masses   MS: no gross musculoskeletal defects noted, no edema  SKIN: no suspicious lesions or rashes  NEURO: Normal strength and tone, mentation intact and speech normal  PSYCH: mentation appears normal, affect normal/bright    Abdominal XR reviewed in urgent care with patient.  No no acute pathology.  Moderate stool  burden.

## 2025-06-01 NOTE — PATIENT INSTRUCTIONS
X-ray of your abdomen showed high stool burden, meaning that you do have been very constipated.  I recommend using MiraLAX twice daily until you start having diarrhea.  Please continue to stay hydrated with oral fluids during this time.    Please use the Pepcid daily to help with some of the bloating symptoms that you have been having.  I recommend taking this 30 to 60 minutes before meals.    Reasons to return to urgent care or go to the ED include but are not limited to:  -Chest pain  -Dyspnea  -Shortness of breath  -Worsening abdominal pain  -Inability to urinate  -Nausea w/vomiting  -Inability to drink water    [facility] nearby [place]:  -[facility]  -[facility]  -[facility]  -[facility]

## 2025-06-02 ENCOUNTER — TELEPHONE (OUTPATIENT)
Dept: PEDIATRICS | Facility: CLINIC | Age: 50
End: 2025-06-02

## 2025-06-02 ENCOUNTER — RESULTS FOLLOW-UP (OUTPATIENT)
Dept: URGENT CARE | Facility: URGENT CARE | Age: 50
End: 2025-06-02

## 2025-06-02 ENCOUNTER — TELEPHONE (OUTPATIENT)
Dept: URGENT CARE | Facility: URGENT CARE | Age: 50
End: 2025-06-02
Payer: COMMERCIAL

## 2025-06-02 ENCOUNTER — TELEPHONE (OUTPATIENT)
Dept: FAMILY MEDICINE | Facility: CLINIC | Age: 50
End: 2025-06-02

## 2025-06-02 DIAGNOSIS — K56.7 ILEUS (H): Primary | ICD-10-CM

## 2025-06-02 PROCEDURE — 99207 PR NON-BILLABLE SERV PER CHARTING: CPT

## 2025-06-02 NOTE — TELEPHONE ENCOUNTER
Pt returning call to [facility]. Pt states he has 2 BM this morning already and feeling better. Pt denies pain, nausea, vomiting. Pt will call back if problems arise     Sharee Vale RN on 6/2/2025 at 12:07 PM

## 2025-06-02 NOTE — TELEPHONE ENCOUNTER
Received referral from the urgent care regarding this patient.  He was seen yesterday in urgent care and abdominal x-ray came back with a air-filled small bowel loop.  Would like to check back in with the patient to see how he is feeling.  If he is not able to eat or drink, has escalating abdominal pain, is vomiting or not pooping then we would like to see him here in the [facility] for further evaluation or he could present to the ER if he is rapidly getting worse.  If he is feeling better and having stool output and decreased abdominal pain then he can continue to treat the constipation at home and follow-up as needed.

## 2025-06-02 NOTE — TELEPHONE ENCOUNTER
Attempted to call patient, no answer, left a message.     It appears patient was seen yesterday for abdominal pain constipation and not passing gas, patient had an abdominal x-ray which was done yesterday, the provider at that time and sent patient home endorsing moderate stool burden   however x-ray read resulted today showing air-filled small bowel loops suspicious for ileus, recommend patient present to the emergency department for higher level of care, we will attempt to recall patient again today.    I did not see patient yesterday, I am the in-basket covering provider

## 2025-08-20 ENCOUNTER — PATIENT OUTREACH (OUTPATIENT)
Dept: CARE COORDINATION | Facility: CLINIC | Age: 50
End: 2025-08-20
Payer: COMMERCIAL